# Patient Record
Sex: FEMALE | Race: WHITE | Employment: OTHER | URBAN - METROPOLITAN AREA
[De-identification: names, ages, dates, MRNs, and addresses within clinical notes are randomized per-mention and may not be internally consistent; named-entity substitution may affect disease eponyms.]

---

## 2020-07-13 ENCOUNTER — HOSPITAL ENCOUNTER (EMERGENCY)
Facility: CLINIC | Age: 65
Discharge: HOME | End: 2020-07-13
Attending: EMERGENCY MEDICINE | Admitting: EMERGENCY MEDICINE
Payer: COMMERCIAL

## 2020-07-13 ENCOUNTER — APPOINTMENT (OUTPATIENT)
Dept: CT IMAGING | Facility: CLINIC | Age: 65
End: 2020-07-13
Attending: EMERGENCY MEDICINE
Payer: COMMERCIAL

## 2020-07-13 VITALS
SYSTOLIC BLOOD PRESSURE: 150 MMHG | BODY MASS INDEX: 21.34 KG/M2 | TEMPERATURE: 98.6 F | WEIGHT: 125 LBS | OXYGEN SATURATION: 97 % | HEART RATE: 71 BPM | DIASTOLIC BLOOD PRESSURE: 85 MMHG | HEIGHT: 64 IN | RESPIRATION RATE: 18 BRPM

## 2020-07-13 DIAGNOSIS — B02.9 HERPES ZOSTER WITHOUT COMPLICATION: ICD-10-CM

## 2020-07-13 DIAGNOSIS — R51.9 SCALP PAIN: ICD-10-CM

## 2020-07-13 PROCEDURE — 99284 EMERGENCY DEPT VISIT MOD MDM: CPT | Mod: 25

## 2020-07-13 PROCEDURE — 70450 CT HEAD/BRAIN W/O DYE: CPT

## 2020-07-13 RX ORDER — SIMVASTATIN 10 MG
10 TABLET ORAL AT BEDTIME
COMMUNITY
Start: 2020-03-24

## 2020-07-13 RX ORDER — GABAPENTIN 300 MG/1
300 CAPSULE ORAL 3 TIMES DAILY PRN
COMMUNITY
Start: 2020-04-21

## 2020-07-13 RX ORDER — VALACYCLOVIR HYDROCHLORIDE 1 G/1
1000 TABLET, FILM COATED ORAL 3 TIMES DAILY
Qty: 21 TABLET | Refills: 0 | Status: SHIPPED | OUTPATIENT
Start: 2020-07-13 | End: 2020-08-11

## 2020-07-13 RX ORDER — LISINOPRIL 20 MG/1
20 TABLET ORAL DAILY
COMMUNITY
Start: 2020-03-24

## 2020-07-13 RX ORDER — LEVOTHYROXINE SODIUM 100 UG/1
100 TABLET ORAL DAILY
COMMUNITY
Start: 2020-04-27

## 2020-07-13 RX ORDER — TRAMADOL HYDROCHLORIDE 50 MG/1
50 TABLET ORAL EVERY 6 HOURS PRN
COMMUNITY
Start: 2020-01-20

## 2020-07-13 ASSESSMENT — ENCOUNTER SYMPTOMS
COUGH: 0
SHORTNESS OF BREATH: 0
DIARRHEA: 0
HEADACHES: 0
SORE THROAT: 0
VOMITING: 0
NECK STIFFNESS: 1
CHILLS: 0
NAUSEA: 0
FEVER: 0

## 2020-07-13 ASSESSMENT — MIFFLIN-ST. JEOR: SCORE: 1092

## 2020-07-13 NOTE — ED PROVIDER NOTES
"  History     Chief Complaint:  Scalp sensitivity    The history is provided by the patient.      Debbie Minaya is a 65 year old anticoagulated female who presents for an evaluation of pain on the back center of her scalp that radiates laterally which began 6 days ago when she noticed discomfort. She describes her pain as itchy, as if \"someone is pulling my hair\", and very sensitive.     Here, she notes that she has been taking ibuprofen to relieve her pain, but does not help. She also notes that when the pain returns it is in the same spot and also notes neck stiffness. Patient denies any fever, coughing, or COVID symptoms.     Allergies:  No Known Drug Allergies     Medications:    Valtrex  Augmentin  Zanaflex  Neurontin  Synthroid  Zocor  Zestril  Ultram  Aspirin 81 mg    Past Medical History:    Tobacco abuse  Hypertension  Hyperlipidemia  Hypothyroidism  Diffuse cystic mastopathy  Hand arthritis  Allergic rhinitis  Osteoporosis  Osteoarthritis; right knee  Degenerative joint disease    Past Surgical History:    Tubal ligation  Carpal tunnel release     Family History:    Mother - lung cancer, cataract, osteoarthritis  Father - heart disease  Son - amblyopia     Social History:  The patient was unaccompanied to the ED.  Smoking Status: Yes - every day smoker - electric cigarette (0.5 packs per day)  Smokeless Tobacco: Never used  Alcohol Use: Yes - 1-2 drinks monthly  Marital Status:   [2]     Review of Systems   Constitutional: Negative for chills and fever.   HENT: Negative for sore throat.    Respiratory: Negative for cough and shortness of breath.    Cardiovascular: Negative for chest pain.   Gastrointestinal: Negative for diarrhea, nausea and vomiting.   Musculoskeletal: Positive for neck stiffness.   Skin:        Scalp sensitivity   Neurological: Negative for headaches.   All other systems reviewed and are negative.    Physical Exam     Patient Vitals for the past 24 hrs:   BP Temp Pulse Heart " "Rate Resp SpO2 Height Weight   07/13/20 1630 (!) 150/85 -- 71 -- -- 97 % -- --   07/13/20 1615 -- -- -- -- -- 96 % -- --   07/13/20 1600 -- -- -- -- -- 97 % -- --   07/13/20 1545 -- -- -- -- -- 98 % -- --   07/13/20 1530 (!) 152/92 -- -- 78 -- 98 % -- --   07/13/20 1409 (!) 153/78 98.6  F (37  C) -- 98 18 98 % 1.626 m (5' 4\") 56.7 kg (125 lb)     Physical Exam  Nursing note and vitals reviewed.  Constitutional:  Appears well-developed and well-nourished.   HENT:    TM clear  Head:    Errythematist slightly raised focal rash to the right upper occipital region with localized tenderness. No drainage.  Eyes:    Pupils are equal, round, and reactive to light.   Neck:    Normal range of motion. Neck supple.      No tracheal deviation present. No thyromegaly present. No meningeal sign.  Cardiovascular:  Normal rate, regular rhythm, no murmur   Pulmonary/Chest: Breath sounds are clear and equal without wheezes or crackles.  Abdominal:   Soft. Bowel sounds are normal. Exhibits no distension and      no mass. There is no tenderness.      There is no rebound and no guarding.   Musculoskeletal:  Exhibits no edema.   Lymphadenopathy:  No cervical adenopathy.   Neurological:   Alert and oriented to person, place, and time. GCS 15.  CN 2-12 intact.  and proximal upper extremity strength strong and equal.  Bilateral lower extremity strength strong and equal, including strong dorsiflexion and plantarflexion strength.  Sensation intact and equal to the face, arms and legs.  No facial droop or weakness. Normal speech.  Follows commands and answers questions normally.    Skin:    Skin is warm and dry. No pallor. Errythematist slightly raised focal rash to the right upper occipital region with localized tenderness. No drainage.  Emergency Department Course   Imaging:  Radiographic findings were communicated with the patient who voiced understanding of the findings.    CT Head w/o Contrast  No evidence of acute intracranial " hemorrhage, mass, or   herniation.   As read by Radiology.    Emergency Department Course:  Past medical records, nursing notes, and vitals reviewed.  1530: I performed an exam of the patient and obtained history, as documented above.  The patient was sent for a head CT while in the emergency department, findings above.    1810: I rechecked the patient.  Findings and plan explained to the Patient. Patient discharged home with instructions regarding supportive care, medications, and reasons to return. The importance of close follow-up was reviewed.     Impression & Plan    Medical Decision Making:  Debbie Minaya is a 65 year old female who presents to the emergency department today for evaluation of scalp sensitivity. The patient has scalp pain and a rash, which is consistent with shingles. There was no sign of meningitis or encephalitis clinically, and no sign of intracranial bleeding or sinusitis on her CT. I discussed with her she could stop her antibiotics that was prescribed. She was prescribed Valtrex and intructed on signs and symptoms to return for including fever, global headache, neck pain, neck stiffness, or other concerning symptoms. Follow up with her doctor for recheck.     Diagnosis:    ICD-10-CM    1. Scalp pain  R51    2. Herpes zoster without complication  B02.9        Disposition:  discharged to home    Discharge Medications:   Details   valACYclovir (VALTREX) 1000 mg tablet Take 1 tablet (1,000 mg) by mouth 3 times daily for 7 days, Disp-21 tablet,R-0, Local Print     Scribe Disclosure:  IAnastasia, am serving as a scribe at 9:16 PM on 7/13/2020 to document services personally performed by Chel Alfredo MD based on my observations and the provider's statements to me.    Mario TELLO, am serving as a scribe at 7:20 PM on 7/13/2020 to document services personally performed by Chel Alfredo MD based on my observations and the provider's statements to me.       7/13/2020    EMERGENCY  DEPARTMENT         Chel Alfredo MD  07/13/20 2224       Chel Alfredo MD  07/13/20 2224       Chel Alfredo MD  07/13/20 2220

## 2020-07-13 NOTE — ED AVS SNAPSHOT
Emergency Department  [address] [place] [address]  Phone:  [phone]  Fax:  [phone]                                    Debbie Minaya   MRN: [id number]    Department:   Emergency Department   Date of Visit:  7/13/2020           After Visit Summary Signature Page    I have received my discharge instructions, and my questions have been answered. I have discussed any challenges I see with this plan with the nurse or doctor.    ..........................................................................................................................................  Patient/Patient Representative Signature      ..........................................................................................................................................  Patient Representative Print Name and Relationship to Patient    ..................................................               ................................................  Date                                   Time    ..........................................................................................................................................  Reviewed by Signature/Title    ...................................................              ..............................................  Date                                               Time          22EPIC Rev 08/18

## 2020-07-13 NOTE — ED TRIAGE NOTES
"Headache for one week, started in the back of head with palpation and now \"feels like someone is pulling her hair.\" Took 2 tylenol at 1300.   "

## 2020-08-11 ENCOUNTER — HOSPITAL ENCOUNTER (OUTPATIENT)
Facility: CLINIC | Age: 65
Setting detail: OBSERVATION
Discharge: HOME | End: 2020-08-13
Attending: EMERGENCY MEDICINE | Admitting: INTERNAL MEDICINE
Payer: COMMERCIAL

## 2020-08-11 DIAGNOSIS — E87.1 HYPONATREMIA: ICD-10-CM

## 2020-08-11 PROBLEM — B37.0 THRUSH: Status: ACTIVE | Noted: 2020-08-11

## 2020-08-11 PROBLEM — M54.9 BACK PAIN: Status: ACTIVE | Noted: 2020-08-11

## 2020-08-11 PROBLEM — D61.818 PANCYTOPENIA (H): Status: RESOLVED | Noted: 2020-08-11 | Resolved: 2020-08-11

## 2020-08-11 PROBLEM — B37.0 THRUSH: Status: RESOLVED | Noted: 2020-08-11 | Resolved: 2020-08-11

## 2020-08-11 PROBLEM — R42 DIZZINESS: Status: ACTIVE | Noted: 2020-08-11

## 2020-08-11 PROBLEM — I10 BENIGN ESSENTIAL HYPERTENSION: Status: ACTIVE | Noted: 2020-08-11

## 2020-08-11 PROBLEM — F17.200 SMOKER: Status: ACTIVE | Noted: 2020-08-11

## 2020-08-11 PROBLEM — R11.0 NAUSEA: Status: ACTIVE | Noted: 2020-08-11

## 2020-08-11 PROBLEM — E87.6 HYPOKALEMIA: Status: ACTIVE | Noted: 2020-08-11

## 2020-08-11 PROBLEM — D61.818 PANCYTOPENIA (H): Status: ACTIVE | Noted: 2020-08-11

## 2020-08-11 LAB
ALBUMIN SERPL-MCNC: 4.2 G/DL (ref 3.4–5)
ALBUMIN UR-MCNC: NEGATIVE MG/DL
ALP SERPL-CCNC: 77 U/L (ref 40–150)
ALT SERPL W P-5'-P-CCNC: 29 U/L (ref 0–50)
ANION GAP SERPL CALCULATED.3IONS-SCNC: 6 MMOL/L (ref 3–14)
APPEARANCE UR: CLEAR
AST SERPL W P-5'-P-CCNC: 17 U/L (ref 0–45)
BASOPHILS # BLD AUTO: 0.1 10E9/L (ref 0–0.2)
BASOPHILS NFR BLD AUTO: 0.6 %
BILIRUB SERPL-MCNC: 0.4 MG/DL (ref 0.2–1.3)
BILIRUB UR QL STRIP: NEGATIVE
BUN SERPL-MCNC: 7 MG/DL (ref 7–30)
CALCIUM SERPL-MCNC: 9.3 MG/DL (ref 8.5–10.1)
CHLORIDE SERPL-SCNC: 89 MMOL/L (ref 94–109)
CO2 SERPL-SCNC: 27 MMOL/L (ref 20–32)
COLOR UR AUTO: YELLOW
CREAT SERPL-MCNC: 0.46 MG/DL (ref 0.52–1.04)
DIFFERENTIAL METHOD BLD: ABNORMAL
EOSINOPHIL # BLD AUTO: 0.1 10E9/L (ref 0–0.7)
EOSINOPHIL NFR BLD AUTO: 0.8 %
ERYTHROCYTE [DISTWIDTH] IN BLOOD BY AUTOMATED COUNT: 12.5 % (ref 10–15)
GFR SERPL CREATININE-BSD FRML MDRD: >90 ML/MIN/{1.73_M2}
GLUCOSE SERPL-MCNC: 120 MG/DL (ref 70–99)
GLUCOSE UR STRIP-MCNC: NEGATIVE MG/DL
HCT VFR BLD AUTO: 37.2 % (ref 35–47)
HGB BLD-MCNC: 13.4 G/DL (ref 11.7–15.7)
HGB UR QL STRIP: ABNORMAL
IMM GRANULOCYTES # BLD: 0 10E9/L (ref 0–0.4)
IMM GRANULOCYTES NFR BLD: 0.3 %
INTERPRETATION ECG - MUSE: NORMAL
KETONES UR STRIP-MCNC: NEGATIVE MG/DL
LEUKOCYTE ESTERASE UR QL STRIP: NEGATIVE
LYMPHOCYTES # BLD AUTO: 2.3 10E9/L (ref 0.8–5.3)
LYMPHOCYTES NFR BLD AUTO: 21.8 %
MCH RBC QN AUTO: 33.3 PG (ref 26.5–33)
MCHC RBC AUTO-ENTMCNC: 36 G/DL (ref 31.5–36.5)
MCV RBC AUTO: 93 FL (ref 78–100)
MONOCYTES # BLD AUTO: 0.5 10E9/L (ref 0–1.3)
MONOCYTES NFR BLD AUTO: 4.8 %
NEUTROPHILS # BLD AUTO: 7.6 10E9/L (ref 1.6–8.3)
NEUTROPHILS NFR BLD AUTO: 71.7 %
NITRATE UR QL: NEGATIVE
NRBC # BLD AUTO: 0 10*3/UL
NRBC BLD AUTO-RTO: 0 /100
PH UR STRIP: 6 PH (ref 5–7)
PLATELET # BLD AUTO: 476 10E9/L (ref 150–450)
POTASSIUM SERPL-SCNC: 3.2 MMOL/L (ref 3.4–5.3)
PROT SERPL-MCNC: 7.3 G/DL (ref 6.8–8.8)
RBC # BLD AUTO: 4.02 10E12/L (ref 3.8–5.2)
RBC #/AREA URNS AUTO: 6 /HPF (ref 0–2)
SODIUM SERPL-SCNC: 122 MMOL/L (ref 133–144)
SODIUM SERPL-SCNC: 134 MMOL/L (ref 133–144)
SOURCE: ABNORMAL
SP GR UR STRIP: 1.01 (ref 1–1.03)
TROPONIN I SERPL-MCNC: <0.015 UG/L (ref 0–0.04)
TSH SERPL DL<=0.005 MIU/L-ACNC: 2.28 MU/L (ref 0.4–4)
UROBILINOGEN UR STRIP-MCNC: NORMAL MG/DL (ref 0–2)
WBC # BLD AUTO: 10.5 10E9/L (ref 4–11)
WBC #/AREA URNS AUTO: 0 /HPF (ref 0–5)

## 2020-08-11 PROCEDURE — 25000132 ZZH RX MED GY IP 250 OP 250 PS 637: Performed by: INTERNAL MEDICINE

## 2020-08-11 PROCEDURE — 84443 ASSAY THYROID STIM HORMONE: CPT | Performed by: EMERGENCY MEDICINE

## 2020-08-11 PROCEDURE — 25800030 ZZH RX IP 258 OP 636: Performed by: INTERNAL MEDICINE

## 2020-08-11 PROCEDURE — G0378 HOSPITAL OBSERVATION PER HR: HCPCS

## 2020-08-11 PROCEDURE — 84295 ASSAY OF SERUM SODIUM: CPT | Performed by: INTERNAL MEDICINE

## 2020-08-11 PROCEDURE — 36415 COLL VENOUS BLD VENIPUNCTURE: CPT | Performed by: INTERNAL MEDICINE

## 2020-08-11 PROCEDURE — 80053 COMPREHEN METABOLIC PANEL: CPT | Performed by: EMERGENCY MEDICINE

## 2020-08-11 PROCEDURE — 99285 EMERGENCY DEPT VISIT HI MDM: CPT | Mod: 25

## 2020-08-11 PROCEDURE — U0003 INFECTIOUS AGENT DETECTION BY NUCLEIC ACID (DNA OR RNA); SEVERE ACUTE RESPIRATORY SYNDROME CORONAVIRUS 2 (SARS-COV-2) (CORONAVIRUS DISEASE [COVID-19]), AMPLIFIED PROBE TECHNIQUE, MAKING USE OF HIGH THROUGHPUT TECHNOLOGIES AS DESCRIBED BY CMS-2020-01-R: HCPCS | Performed by: INTERNAL MEDICINE

## 2020-08-11 PROCEDURE — 96365 THER/PROPH/DIAG IV INF INIT: CPT

## 2020-08-11 PROCEDURE — 84484 ASSAY OF TROPONIN QUANT: CPT | Performed by: EMERGENCY MEDICINE

## 2020-08-11 PROCEDURE — 85025 COMPLETE CBC W/AUTO DIFF WBC: CPT | Performed by: EMERGENCY MEDICINE

## 2020-08-11 PROCEDURE — 99220 ZZC INITIAL OBSERVATION CARE,LEVL III: CPT | Performed by: INTERNAL MEDICINE

## 2020-08-11 PROCEDURE — 93005 ELECTROCARDIOGRAM TRACING: CPT

## 2020-08-11 PROCEDURE — 81001 URINALYSIS AUTO W/SCOPE: CPT | Performed by: EMERGENCY MEDICINE

## 2020-08-11 PROCEDURE — 25000128 H RX IP 250 OP 636: Performed by: EMERGENCY MEDICINE

## 2020-08-11 RX ORDER — POTASSIUM CHLORIDE 1500 MG/1
20 TABLET, EXTENDED RELEASE ORAL ONCE
Status: COMPLETED | OUTPATIENT
Start: 2020-08-11 | End: 2020-08-11

## 2020-08-11 RX ORDER — POTASSIUM CL/LIDO/0.9 % NACL 10MEQ/0.1L
10 INTRAVENOUS SOLUTION, PIGGYBACK (ML) INTRAVENOUS
Status: DISCONTINUED | OUTPATIENT
Start: 2020-08-11 | End: 2020-08-13 | Stop reason: HOSPADM

## 2020-08-11 RX ORDER — ACETAMINOPHEN 325 MG/1
325-650 TABLET ORAL EVERY 6 HOURS PRN
COMMUNITY

## 2020-08-11 RX ORDER — POTASSIUM CHLORIDE 1500 MG/1
40 TABLET, EXTENDED RELEASE ORAL ONCE
Status: COMPLETED | OUTPATIENT
Start: 2020-08-11 | End: 2020-08-11

## 2020-08-11 RX ORDER — LISINOPRIL 20 MG/1
20 TABLET ORAL DAILY
Status: DISCONTINUED | OUTPATIENT
Start: 2020-08-12 | End: 2020-08-13 | Stop reason: HOSPADM

## 2020-08-11 RX ORDER — NALOXONE HYDROCHLORIDE 0.4 MG/ML
.1-.4 INJECTION, SOLUTION INTRAMUSCULAR; INTRAVENOUS; SUBCUTANEOUS
Status: DISCONTINUED | OUTPATIENT
Start: 2020-08-11 | End: 2020-08-13 | Stop reason: HOSPADM

## 2020-08-11 RX ORDER — AMLODIPINE BESYLATE 5 MG/1
5 TABLET ORAL 2 TIMES DAILY PRN
Status: DISCONTINUED | OUTPATIENT
Start: 2020-08-11 | End: 2020-08-13 | Stop reason: HOSPADM

## 2020-08-11 RX ORDER — ASPIRIN 81 MG/1
81 TABLET, CHEWABLE ORAL DAILY
Status: DISCONTINUED | OUTPATIENT
Start: 2020-08-11 | End: 2020-08-13 | Stop reason: HOSPADM

## 2020-08-11 RX ORDER — ONDANSETRON 2 MG/ML
4 INJECTION INTRAMUSCULAR; INTRAVENOUS EVERY 6 HOURS PRN
Status: DISCONTINUED | OUTPATIENT
Start: 2020-08-11 | End: 2020-08-13 | Stop reason: HOSPADM

## 2020-08-11 RX ORDER — SIMVASTATIN 10 MG
10 TABLET ORAL AT BEDTIME
Status: DISCONTINUED | OUTPATIENT
Start: 2020-08-11 | End: 2020-08-13 | Stop reason: HOSPADM

## 2020-08-11 RX ORDER — ONDANSETRON 4 MG/1
4 TABLET, ORALLY DISINTEGRATING ORAL EVERY 6 HOURS PRN
Status: DISCONTINUED | OUTPATIENT
Start: 2020-08-11 | End: 2020-08-13 | Stop reason: HOSPADM

## 2020-08-11 RX ORDER — ACETAMINOPHEN 650 MG/1
650 SUPPOSITORY RECTAL EVERY 4 HOURS PRN
Status: DISCONTINUED | OUTPATIENT
Start: 2020-08-11 | End: 2020-08-13 | Stop reason: HOSPADM

## 2020-08-11 RX ORDER — NYSTATIN 100000/ML
500000 SUSPENSION, ORAL (FINAL DOSE FORM) ORAL 4 TIMES DAILY
Status: DISCONTINUED | OUTPATIENT
Start: 2020-08-11 | End: 2020-08-11

## 2020-08-11 RX ORDER — SODIUM CHLORIDE 9 MG/ML
INJECTION, SOLUTION INTRAVENOUS CONTINUOUS
Status: DISCONTINUED | OUTPATIENT
Start: 2020-08-11 | End: 2020-08-12

## 2020-08-11 RX ORDER — LEVOTHYROXINE SODIUM 100 UG/1
100 TABLET ORAL
Status: DISCONTINUED | OUTPATIENT
Start: 2020-08-12 | End: 2020-08-13 | Stop reason: HOSPADM

## 2020-08-11 RX ORDER — ACETAMINOPHEN 325 MG/1
650 TABLET ORAL EVERY 4 HOURS PRN
Status: DISCONTINUED | OUTPATIENT
Start: 2020-08-11 | End: 2020-08-13 | Stop reason: HOSPADM

## 2020-08-11 RX ORDER — ASPIRIN 81 MG/1
81 TABLET, CHEWABLE ORAL DAILY
COMMUNITY

## 2020-08-11 RX ORDER — HYDROCHLOROTHIAZIDE 12.5 MG/1
12.5 CAPSULE ORAL DAILY
Status: ON HOLD | COMMUNITY
End: 2020-08-13

## 2020-08-11 RX ADMIN — ACETAMINOPHEN 650 MG: 325 TABLET, FILM COATED ORAL at 23:35

## 2020-08-11 RX ADMIN — ASPIRIN 81 MG 81 MG: 81 TABLET ORAL at 20:57

## 2020-08-11 RX ADMIN — POTASSIUM CHLORIDE 20 MEQ: 1500 TABLET, EXTENDED RELEASE ORAL at 23:33

## 2020-08-11 RX ADMIN — SIMVASTATIN 10 MG: 10 TABLET, FILM COATED ORAL at 20:57

## 2020-08-11 RX ADMIN — SODIUM CHLORIDE: 9 INJECTION, SOLUTION INTRAVENOUS at 20:45

## 2020-08-11 RX ADMIN — POTASSIUM CHLORIDE 40 MEQ: 1500 TABLET, EXTENDED RELEASE ORAL at 20:57

## 2020-08-11 RX ADMIN — Medication 10 MEQ: at 18:01

## 2020-08-11 ASSESSMENT — MIFFLIN-ST. JEOR: SCORE: 1104.7

## 2020-08-11 NOTE — H&P
[facility]    History and Physical  Hospitalist       Date of Admission:  8/11/2020    Assessment & Plan   65 year old female with past medical hx of HTN, who presents with dizziness and weakness and found to have low Sodium and potassium:     Summary:    Principal Problem:    Hyponatremia -- Possibly 2nd to hydrochlorothiazide   -- this might be causing her dysequilibrium and weakness   -- IV normal saline at 100 ml/hr, and stop hydrochlorothiazide      Hypokalemia -- related to the hydrochlorothiazide   -- replace    Active Problems:    Benign essential hypertension   -- continue Lisinopril 20 mg daily, will add Norvasc 5 mg bid PRN (and if needed continue 5 to 10 mg to treat hypertension)      Dizziness -- ?related to low sodium   -- further workup if persistent symptoms.       Nausea      Back pain -- suspect musculoskeletal, now appears resolved.  Can not exclude that she passed a kidney stone, but pain was mild, and bilateral      Smoker -- discussed smoking cessation       Plan: IV normal saline, repeat BMP in AM, replace potassium.      DVT Prophylaxis: Pneumatic Compression Devices  Code Status: Full Code    Disposition: Expected discharge in 1-2 days once sodium improved    Lokesh Pang MD  Pager: [phone]  Cell Phone:  [phone]     Primary Care Physician   [facility]    Chief Complaint   Dizziness, weakness    History is obtained from Patient    History of Present Illness   65 year old female with past medical hx of HTN, who presents with dizziness and weakness and nausea. She reports that she recently had shingles across the top of her head (seen in ER -- tender spot over occiput, thought it could be shingles involving midline area of C2 dermatome -- but she has also had Herpes Zoster vaccine x 2, so not definite Herpes Zoster infection). When she gets a sinus headache she pain over the Occiput -- which she relates to flair of the shingles pain. Today,  she was supposed to get blood drawn for an electrolyte check but discovered she had a high blood pressure.  Reports that she was put on 2 doses of Diflucan for a possible yeast infection after she got treated with Amoxicillin last week for possible sinus infection -- but it didn't help her symptoms. She was put on hydrochlorothiazide about 5 years ago but was discontinued for low sodium levels. Also, states she was having bilateral flank pain and back pain, and subsequent dysuria -- thought she might have had a kidney stone (she never had one, but her  has). She went to her PCP about a month ago and had blood in her urine so she requested a recheck. Patient also reports that she has been noticing a really bad smell. She was using saline rinses to help her symptoms but her PCP told her to stop.     In ER, initial /69, afebrile, Sodium 122 with potassium 3.2, and creatinine 0.46, Trop < 0.015, and platelets mildly elevated at 476.  UA here with 6 RBC's present, and no WBC's.  EKG with NSR with rate of 68.      PAST MEDICAL HISTORY    Past Medical History:   Diagnosis Date     Hypertension      Microscopic hematuria      Patella fracture, Right 2020    treated with knee immobilizer        PAST SURGICAL HISTORY    Past Surgical History:   Procedure Laterality Date     CARPAL TUNNEL RELEASE RT/LT Bilateral      RELEASE TRIGGER FINGER Bilateral         Prior to Admission Medications   Prior to Admission Medications   Prescriptions Last Dose Informant Patient Reported? Taking?   acetaminophen (TYLENOL) 325 MG tablet Past Week at Unknown time  Yes Yes   Sig: Take 325-650 mg by mouth every 6 hours as needed for mild pain   aspirin (ASA) 81 MG chewable tablet 8/10/2020 at Unknown time Self Yes Yes   Sig: Take 81 mg by mouth daily   gabapentin (NEURONTIN) 300 MG capsule Past Month at Unknown time Self Yes Yes   Sig: Take 300 mg by mouth 3 times daily as needed    hydrochlorothiazide (MICROZIDE) 12.5 MG capsule  8/11/2020 at Unknown time Self Yes Yes   Sig: Take 12.5 mg by mouth daily   levothyroxine (SYNTHROID) 100 MCG tablet 8/11/2020 at Unknown time Self Yes Yes   Sig: Take 100 mcg by mouth daily    lisinopril (ZESTRIL) 20 MG tablet 8/11/2020 at Unknown time Self Yes Yes   Sig: Take 20 mg by mouth daily    simvastatin (ZOCOR) 10 MG tablet 8/10/2020 at Unknown time Self Yes Yes   Sig: Take 10 mg by mouth At Bedtime    tiZANidine (ZANAFLEX) 4 MG tablet  Self Yes No   Sig: TAKE 1 TABLET BY MOUTH TWICE DAILY AS NEEDED   traMADol (ULTRAM) 50 MG tablet  Self Yes No   Sig: Take 50 mg by mouth every 6 hours as needed       Facility-Administered Medications: None     Allergies   No Known Allergies    SOCIAL HISTORY    Social History     Social History Narrative    , one son, she is a retired . (last updated 8/11/2020)       Social History     Tobacco Use     Smoking status: Current Every Day Smoker     Packs/day: 1.00     Years: 48.00     Pack years: 48.00   Substance Use Topics     Alcohol use: Yes     Comment: 1 beer a week     Drug use: Not on file        FAMILY HISTORY    Family History   Problem Relation Age of Onset     Lung Cancer Mother      Myocardial Infarction Father         Review of Systems   The 10 point Review of Systems is negative other than noted in the HPI or here.       PHYSICAL EXAM     Temp: 98.1  F (36.7  C) Temp src: Oral BP: 135/74 Pulse: 68   Resp: 16 SpO2: 96 % O2 Device: None (Room air)    Vital Signs with Ranges  Temp:  [98  F (36.7  C)-98.1  F (36.7  C)] 98.1  F (36.7  C)  Pulse:  [68-78] 68  Resp:  [16] 16  BP: (135-190)/(64-80) 135/74  SpO2:  [96 %-100 %] 96 %  125 lbs 0 oz    Constitutional: Awake, alert, cooperative, no apparent distress.  Eyes: Conjunctiva and pupils examined and normal.  HEENT: Moist mucous membranes, normal dentition.  Respiratory: Clear to auscultation bilaterally, no crackles or wheezing.  Cardiovascular: Regular rate and rhythm, normal S1 and S2, and no  murmur noted, no carotid bruits.  No ankle edema.   GI: Soft, non-distended, non-tender, normal bowel sounds.  Lymph/Hematologic: No anterior cervical, supraclavicular or axillary adenopathy.  Skin: No rashes, no cyanosis.   Musculoskeletal: No joint swelling, erythema or tenderness.  Neurologic: Alert, Ox3, Cranial nerves 2-12 intact, no focal weakness or numbness  Psychiatric:  No obvious anxiety or depression.    Data   Data reviewed today:  I personally reviewed the EKG tracing showing as above (NSR).  Recent Labs   Lab 08/11/20  1625   WBC 10.5   HGB 13.4   MCV 93   *   *   POTASSIUM 3.2*   CHLORIDE 89*   CO2 27   BUN 7   CR 0.46*   ANIONGAP 6   CHI 9.3   *   ALBUMIN 4.2   PROTTOTAL 7.3   BILITOTAL 0.4   ALKPHOS 77   ALT 29   AST 17   TROPI <0.015       Imaging:  No results found for this or any previous visit (from the past 24 hour(s)).

## 2020-08-11 NOTE — ED PROVIDER NOTES
"  History     Chief Complaint:    Dizziness and Flank Pain      The history is provided by the patient.      Debbie Minaya is a 65 year old female with a history of hypertension, hyperlipidemia, and hypothyroidism who presents with dizziness, weakness, and nausea. She reports that she recently had shingles across the top of her head. When she gets a sinus headache her shingles symptoms trigger. Today, she was supposed to get blood drawn for an electrolyte check but discovered she had a high blood pressure. About a week ago she was put on amoxicillin but stopped today due to \"multiple problems\". Reports that she was put on 2 rounds of Diflucan for a yeast infection.  Also, states she was having flank and back pain and thought she had a kidney stone and reports she went to her PCP about a month ago and had blood in her urine so she requested a recheck. Patient also reports that she has been noticing a really bad smell inside her nose which she thinks is a sinus infection. She was using saline rinses to help her symptoms but her PCP told her to stop. She denies any neck pain. She is a poor historian and does not provide a coherant history.  When told her sodium was low she does recall that she was put on hydrochlorothiazide about 4 years ago but was discontinued for low sodium levels.  No fevers or cough.  No CP.    Allergies:  No Known Drug Allergies    Medications:    Diflucan  Oretic  Neurontin  Zocor  Synthroid  Zestril  Zanaflex  Ultram  Amoxicillin    Past Medical History:    RLS  Fracture of patella  Arthritis  Diffuse cystic mastopathy  Tobacco use disorder  Hypertension  Hyperlipidemia  Hypothyroidism  Derangement of meniscus    Past Surgical History:      Tubal ligation  Carpal tunnel release    Family History:    Father: Heart disease, MI  Mother: Lung cancer, cataract, osteoarthritis    Social History:  Smoking Status: Current Smoker  Smokeless Tobacco: Never Used  Alcohol Use: Positive  Drug " "Use: Negative  PCP: [facility], [place]    Marital Status:         Review of Systems     10 systems reviewed and negative except as above and in HPI.    Physical Exam     Patient Vitals for the past 24 hrs:   BP Temp Temp src Pulse Resp SpO2 Height Weight   08/11/20 1700 138/64 -- -- 68 -- -- -- --   08/11/20 1630 (!) 171/76 -- -- 78 -- -- -- --   08/11/20 1625 (!) 145/80 -- -- 77 -- -- -- --   08/11/20 1238 (!) 190/69 98  F (36.7  C) Oral 77 16 100 % 1.646 m (5' 4.8\") 56.7 kg (125 lb)       Physical Exam  General: Resting on the gurney, speech is somewhat slow. Poor insight. Somewhat confused. appears uncomfortable  Head:  The scalp, face, and head appear normal  Mouth/Throat: Mucus membranes are moist. Somewhat boggy mucus membranes consistent with recent nasal congestion.   CV:  Regular rate    Normal S1 and S2  No pathological murmur   Resp:  Breath sounds clear and equal bilaterally    Non-labored, no retractions or accessory muscle use    No coarseness    No wheezing   GI:  Abdomen is soft, no rigidity    No tenderness to palpation  MS:  Normal motor assessment of all extremities.    Good capillary refill noted.      Skin:   No rash or lesions noted.  Neuro:  Speech is normal and fluent. No focal neurological deficit.  Psych: Awake. Alert.  Normal affect.      Appropriate interactions.      Emergency Department Course     ECG:  Indication: Dizziness  Time: 1646  Vent. Rate 68 bpm. AZ interval 146. QRS duration 88. QT/QTc 400/425. P-R-T axis 75 70 61. Normal sinus rhythm. Possible left atrial enlargement. Borderline ECG. Read time: 1656     Laboratory:  Laboratory findings were communicated with the patient and Admitting MD who voiced understanding of the findings.    CMP: Glucose 120 (H), Sodium: 122 (H), Potassium: 3.2 (L), Chloride: 89 (L), Creatinine: 0.46 (L) , o/w WNL     CBC: WBC: 10.5, HGB: 13.4, PLT: 476 (H)    1625 Troponin: <0.015    TSH: 2.28    UA with Microscopic: Blood: Small (A), " RBC: 6 (H) o/w Negative    Interventions:  1801 Potassium Chloride in 10 mg lidocaine infusion 10 mEq IV    Emergency Department Course:  Past medical records, nursing notes, and vitals reviewed.    1625 I performed an exam of the patient as documented above.     EKG obtained in the ED, see results above.     IV was inserted and blood was drawn for laboratory testing, results above.    The patient provided a urine sample here in the emergency department. This was sent for laboratory testing, findings above.    1726 I consulted with Dr. Marie, Hospitalist, regarding the patient's history and presentation here in the emergency department.    1755 I rechecked the patient and discussed the results of her workup thus far.     Findings and plan explained to the Patient who consents to admission. Discussed the patient with Dr. Marie, who will admit the patient to an Adult Med/Surg bed for further monitoring, evaluation, and treatment.    I personally reviewed the laboratory results with the Patient and answered all related questions prior to admission.     Impression & Plan     Medical Decision Making:  Debbie Minaya is a 65 year old female presents to the emergency department with multiple complaints.  Her biggest complaint is feeling dizzy and as though she has a sinus infection.  She is a very poor historian and seems easily confused.  Laboratory evaluation was undertaken and she was noted to be profoundly hyponatremic.  This is most likely the cause of her symptoms.  She is given IV fluids and potassium replacement.  Hyponatremia is most likely secondary to her hydrochlorothiazide use and after informed her of her diagnosis she reports having a problem with low sodium the last time she was on hydrochlorothiazide several years ago as well.  Currently there is no indication for hypertonic saline.  She was discussed with the hospitalist and will be admitted for careful correction of her  hyponatremia.      Diagnosis:    ICD-10-CM    1. Hyponatremia  E87.1 TSH       Disposition:  Admitted to .    Scribe Disclosure:  I, Joshua Yates, am serving as a scribe at 4:30 PM on 8/11/2020 to document services personally performed by Pam Mendez MD based on my observations and the provider's statements to me.        Pam Mendez MD  08/11/20 5221

## 2020-08-11 NOTE — ED NOTES
[facility]  ED Nurse Handoff Report    ED Chief complaint: Dizziness and Flank Pain      ED Diagnosis:   Final diagnoses:   Hyponatremia       Code Status: Full Code    Allergies: No Known Allergies    Patient Story: Patient comes in with dizziness today after being started recently on Hydrochlorothiazide.  She was supposed to have lab work today for follow up because of the new HTN med but stated she came to the ED to be evaluated for her dizziness.  Focused Assessment:  65 year old female who presents with dizziness, weakness, and nausea. She reports that she recently had shingles across the top of her head. When she gets a sinus headache her shingles symptoms trigger. Today she was supposed to get blood drawn for an electrolyte check but discovered she had a high blood pressure. About a week ago she was put on amoxicillin but stopped today due to multiple problems. Reports that she was put on 2 rounds of Diflucan for a yeast infection. Also, states she was having flank and back pain due to a kidney stone. She went to her PCP about a month ago and had blood in her urine so she requested a recheck. Patient also reports that she has been noticing a really bad smell.   She was using saline rinses to help her symptoms but her PCP told her to stop. She denies any neck pain.     Treatments and/or interventions provided: monitoring, EKg, labs, IV fluids  Patient's response to treatments and/or interventions: good    To be done/followed up on inpatient unit:  following of MD orders    Does this patient have any cognitive concerns?: forgetful (hyponatremic)    Activity level - Baseline/Home:  indepdent  Activity Level - Current:   Independent    Patient's Preferred language: English   Needed?: No    Isolation: None  Infection: Not Applicable  none  Bariatric?: No    Vital Signs:   Vitals:    08/11/20 1238 08/11/20 1625 08/11/20 1630 08/11/20 1700   BP: (!) 190/69 (!) 145/80 (!) 171/76 138/64  "  Pulse: 77 77 78 68   Resp: 16      Temp: 98  F (36.7  C)      TempSrc: Oral      SpO2: 100%      Weight: 56.7 kg (125 lb)      Height: 1.646 m (5' 4.8\")          Cardiac Rhythm:     Was the PSS-3 completed:   Yes  What interventions are required if any?               Family Comments:  at home  OBS brochure/video discussed/provided to patient/family: N/A              Name of person given brochure if not patient: n/a              Relationship to patient: n/a    For the majority of the shift this patient's behavior was Green.   Behavioral interventions performed were n/a.    ED NURSE PHONE NUMBER: *[phone]         "

## 2020-08-11 NOTE — ED TRIAGE NOTES
Dizziness started this morning, has had sinus problem the last week. Also, started hydrochlorothiazide recently and was supposed to get labs drawn today for electrolytes. No injury. Also, was having flank pain/back pain and was supposed to get repeat UA as well.

## 2020-08-11 NOTE — PHARMACY-ADMISSION MEDICATION HISTORY
Pharmacy Medication History  Admission medication history interview status for the 8/11/2020  admission is complete. See EPIC admission navigator for prior to admission medications     Medication history sources: Patient and sure scripts     Medication history source reliability: Good  Adherence assessment: Good    Significant changes made to the medication list:  Discontinued:  New: hctz  Changed:           Additional medication history information:       Medication reconciliation completed by provider prior to medication history? No    Time spent in this activity: 15min       Prior to Admission medications    Medication Sig Last Dose Taking? Auth Provider   acetaminophen (TYLENOL) 325 MG tablet Take 325-650 mg by mouth every 6 hours as needed for mild pain Past Week at Unknown time Yes Unknown, Entered By History   aspirin (ASA) 81 MG chewable tablet Take 81 mg by mouth daily 8/10/2020 at Unknown time Yes Unknown, Entered By History   gabapentin (NEURONTIN) 300 MG capsule Take 300 mg by mouth 3 times daily as needed  Past Month at Unknown time Yes Reported, Patient   hydrochlorothiazide (MICROZIDE) 12.5 MG capsule Take 12.5 mg by mouth daily 8/11/2020 at Unknown time Yes Unknown, Entered By History   levothyroxine (SYNTHROID) 100 MCG tablet Take 100 mcg by mouth daily  8/11/2020 at Unknown time Yes Reported, Patient   lisinopril (ZESTRIL) 20 MG tablet Take 20 mg by mouth daily  8/11/2020 at Unknown time Yes Reported, Patient   simvastatin (ZOCOR) 10 MG tablet Take 10 mg by mouth At Bedtime  8/10/2020 at Unknown time Yes Reported, Patient   tiZANidine (ZANAFLEX) 4 MG tablet TAKE 1 TABLET BY MOUTH TWICE DAILY AS NEEDED   Reported, Patient   traMADol (ULTRAM) 50 MG tablet Take 50 mg by mouth every 6 hours as needed    Reported, Patient     Margaret Brumfield PharmD

## 2020-08-12 LAB
ANION GAP SERPL CALCULATED.3IONS-SCNC: 2 MMOL/L (ref 3–14)
ANION GAP SERPL CALCULATED.3IONS-SCNC: 5 MMOL/L (ref 3–14)
BUN SERPL-MCNC: 15 MG/DL (ref 7–30)
BUN SERPL-MCNC: 6 MG/DL (ref 7–30)
CALCIUM SERPL-MCNC: 9 MG/DL (ref 8.5–10.1)
CALCIUM SERPL-MCNC: 9.2 MG/DL (ref 8.5–10.1)
CHLORIDE SERPL-SCNC: 103 MMOL/L (ref 94–109)
CHLORIDE SERPL-SCNC: 106 MMOL/L (ref 94–109)
CO2 SERPL-SCNC: 26 MMOL/L (ref 20–32)
CO2 SERPL-SCNC: 28 MMOL/L (ref 20–32)
CREAT SERPL-MCNC: 0.49 MG/DL (ref 0.52–1.04)
CREAT SERPL-MCNC: 0.59 MG/DL (ref 0.52–1.04)
GFR SERPL CREATININE-BSD FRML MDRD: >90 ML/MIN/{1.73_M2}
GFR SERPL CREATININE-BSD FRML MDRD: >90 ML/MIN/{1.73_M2}
GLUCOSE SERPL-MCNC: 101 MG/DL (ref 70–99)
GLUCOSE SERPL-MCNC: 97 MG/DL (ref 70–99)
POTASSIUM SERPL-SCNC: 4.1 MMOL/L (ref 3.4–5.3)
POTASSIUM SERPL-SCNC: 4.6 MMOL/L (ref 3.4–5.3)
SARS-COV-2 RNA SPEC QL NAA+PROBE: NOT DETECTED
SODIUM SERPL-SCNC: 133 MMOL/L (ref 133–144)
SODIUM SERPL-SCNC: 134 MMOL/L (ref 133–144)
SODIUM SERPL-SCNC: 136 MMOL/L (ref 133–144)
SPECIMEN SOURCE: NORMAL

## 2020-08-12 PROCEDURE — 80048 BASIC METABOLIC PNL TOTAL CA: CPT | Performed by: INTERNAL MEDICINE

## 2020-08-12 PROCEDURE — 25800030 ZZH RX IP 258 OP 636: Performed by: HOSPITALIST

## 2020-08-12 PROCEDURE — 25000132 ZZH RX MED GY IP 250 OP 250 PS 637: Performed by: INTERNAL MEDICINE

## 2020-08-12 PROCEDURE — G0378 HOSPITAL OBSERVATION PER HR: HCPCS

## 2020-08-12 PROCEDURE — 36415 COLL VENOUS BLD VENIPUNCTURE: CPT | Performed by: HOSPITALIST

## 2020-08-12 PROCEDURE — 36415 COLL VENOUS BLD VENIPUNCTURE: CPT | Performed by: INTERNAL MEDICINE

## 2020-08-12 PROCEDURE — 25000132 ZZH RX MED GY IP 250 OP 250 PS 637: Performed by: HOSPITALIST

## 2020-08-12 PROCEDURE — 99226 ZZC SUBSEQUENT OBSERVATION CARE,LEVEL III: CPT | Performed by: HOSPITALIST

## 2020-08-12 PROCEDURE — 84295 ASSAY OF SERUM SODIUM: CPT | Performed by: HOSPITALIST

## 2020-08-12 PROCEDURE — 80048 BASIC METABOLIC PNL TOTAL CA: CPT | Performed by: HOSPITALIST

## 2020-08-12 RX ORDER — DEXTROSE MONOHYDRATE 50 MG/ML
INJECTION, SOLUTION INTRAVENOUS CONTINUOUS
Status: DISCONTINUED | OUTPATIENT
Start: 2020-08-12 | End: 2020-08-12 | Stop reason: ALTCHOICE

## 2020-08-12 RX ORDER — GABAPENTIN 300 MG/1
300 CAPSULE ORAL 3 TIMES DAILY PRN
Status: DISCONTINUED | OUTPATIENT
Start: 2020-08-12 | End: 2020-08-13 | Stop reason: HOSPADM

## 2020-08-12 RX ADMIN — SIMVASTATIN 10 MG: 10 TABLET, FILM COATED ORAL at 21:13

## 2020-08-12 RX ADMIN — LEVOTHYROXINE SODIUM 100 MCG: 100 TABLET ORAL at 06:30

## 2020-08-12 RX ADMIN — ASPIRIN 81 MG 81 MG: 81 TABLET ORAL at 08:05

## 2020-08-12 RX ADMIN — DEXTROSE MONOHYDRATE: 50 INJECTION, SOLUTION INTRAVENOUS at 00:41

## 2020-08-12 RX ADMIN — LISINOPRIL 20 MG: 20 TABLET ORAL at 08:06

## 2020-08-12 RX ADMIN — GABAPENTIN 300 MG: 300 CAPSULE ORAL at 21:16

## 2020-08-12 NOTE — PROGRESS NOTES
"[facility]    Hospitalist Progress Note      Assessment & Plan   Debbie Minaya is a 65 year old female was admitted on 8/11/2020 with PMH hypertension presents with dizziness.      Hyponatremia --  2nd to hydrochlorothiazide    By patient's report this is second time she had symptomatic hyponatremia with use of HCTZ.  Patient had similar symptoms of dizziness, weakness and paresthesia.  With discontinuation of HCTZ and correction of sodium with NS IVF symptoms improved.  -Start p.o.'s and encourage p.o. intake.  -Monitor BP off HCTZ.  At this point he does not appear she needs to start a second antihypertensive,     BP ranges in the hospital over the last 24 hours 105-145.   -Recheck sodium, potassium in a.m.    Recommend outpatient Clinic follow-up of BP, in addition patient states she just purchased a home ambulatory BP monitor, encouraged use.   -Document in Epic marked hyponatremia with HCTZ  use in Allergy\"/adverse reaction\" [second occurrence with symptomatic hyponatremia.].       Hypokalemia -- related to the hydrochlorothiazide  Repleted, HCTZ discontinued, see above.     Active Problems:    Benign essential hypertension              -- continue Lisinopril 20 mg daily, will add Norvasc 5 mg bid PRN (and if needed continue 5 to 10 mg to treat hypertension) off HCTZ SBP ranges 105-144, continue to hold off starting second antihypertensive, recommend outpatient Clinic follow-up.  As above, patient just purchased home BP monitor, encourage use.         Back pain -- suspect musculoskeletal, now appears resolved.  Can not exclude that she passed a kidney stone, but pain was mild, and bilateral.  UA negative in ED.       Smoker -- discussed smoking cessation    DVT Prophylaxis: Pneumatic Compression Devices  Code Status: Full Code  Expected discharge: Tomorrow, recommended to prior living arrangement once taking in p.o.'s, BP stable on changing antihypertensives.    Jake Cabezas MD  Text " Page (7am - 6pm, M-F)    Interval History   No further dizziness, weakness, paresthesia.  No chest pain, dyspnea, lightheadedness.  Eating, taking in fluids.    SH: No tobacco    FH: Lives with .    ROS: Complete ROS negative except as above.      Physical Exam   Temp: 98  F (36.7  C) Temp src: Oral BP: 105/61 Pulse: 67   Resp: 18 SpO2: 99 % O2 Device: None (Room air)    Vitals:    08/11/20 1238   Weight: 56.7 kg (125 lb)     Vital Signs with Ranges  Temp:  [98  F (36.7  C)-98.2  F (36.8  C)] 98  F (36.7  C)  Pulse:  [67-78] 67  Resp:  [16-18] 18  BP: (105-171)/(61-80) 105/61  SpO2:  [96 %-99 %] 99 %  I/O last 3 completed shifts:  In: 545 [I.V.:545]  Out: -     General/Constitutional:  No acute distress, alert, talkative, cooperative.  HEENT/Head Exam:  atraumatic  Eyes:  PERRL, no conjunctivits  Mouth/Oral Pharynx:  Buccal mucosa WNL  Chest/Respiratory:  Air exchange bilateral lung fields; no rales or wheeze. Respiration nonlabored.  Cardiovascular: No murmur appreciated.  LE edema none  Gastrointestinal/Abdomen:  soft, nontender,  Musculoskeletal:  extremities warm, dry, noncyanotic; no acitve synovitis.  Neurologic:  gross  motor testingnonfocal.  Sensation grossly tested intact.  Psychiatric:  Mental status:  Alert, oriented, affect appropriate  Skin:  No rash noted on gross exam    Medications       aspirin  81 mg Oral Daily     levothyroxine  100 mcg Oral QAM AC     lisinopril  20 mg Oral Daily     simvastatin  10 mg Oral At Bedtime       Data   Recent Labs   Lab 08/12/20  0607 08/12/20  0203 08/11/20  2257 08/11/20  1625   WBC  --   --   --  10.5   HGB  --   --   --  13.4   MCV  --   --   --  93   PLT  --   --   --  476*    133 134 122*   POTASSIUM 4.6  --   --  3.2*   CHLORIDE 106  --   --  89*   CO2 28  --   --  27   BUN 6*  --   --  7   CR 0.49*  --   --  0.46*   ANIONGAP 2*  --   --  6   CHI 9.0  --   --  9.3   *  --   --  120*   ALBUMIN  --   --   --  4.2   PROTTOTAL  --   --   --   7.3   BILITOTAL  --   --   --  0.4   ALKPHOS  --   --   --  77   ALT  --   --   --  29   AST  --   --   --  17   TROPI  --   --   --  <0.015

## 2020-08-12 NOTE — PROVIDER NOTIFICATION
MD Notification    Notified Person: MD    Notified Person Name:Dr. Zazueta    Notification Date/Time: 8/12/20 0035    Notification Interaction: Talked on phone    Purpose of Notification: Increase in sodium from 122-134    Orders Received: IVF and Na rechecks    Comments:

## 2020-08-12 NOTE — PLAN OF CARE
DATE & TIME: 8/11/2020 1915-0730  Cognitive Concerns/ Orientation: A&Ox4  BEHAVIOR & AGGRESSION TOOL COLOR: Green  CIWA SCORE: N/A  ABNL VS/O2: VSS, on RA  MOBILITY: Independent  PAIN MANAGMENT: C/o back discomfort and headache 4/10, PRN Tylenol x1.  DIET: NPO  BOWEL/BLADDER: Continent  ABNL LAB/BG: Na 122-134-133; K-3.2-replaced, recheck this morning.  DRAIN/DEVICES: PIV R forearm infusing D5 at 100/hr.  TELEMETRY RHYTHM: N/A  SKIN: Intact  TESTS/PROCEDURES: N/A  D/C DAY/GOALS/PLACE: Discharge pending lab improvement  OTHER IMPORTANT INFO: Denies dizziness at this time. Sodium recheck 134, normal saline stopped per MD note, started on D5.

## 2020-08-12 NOTE — PROVIDER NOTIFICATION
MD Notification    Notified Person: MD    Notified Person Name:Dr. Cabezas    Notification Date/Time:8/12/2020 @24076    Notification Interaction: talked on phone    Purpose of Notification: pts sodium level increased to 136 from 133.    Orders Received: Stop D5, resume regular diet.    Comments:

## 2020-08-12 NOTE — PLAN OF CARE
DATE & TIME: 8/12/2020 7-3pm  Cognitive Concerns/ Orientation: A&Ox4  BEHAVIOR & AGGRESSION TOOL COLOR: Green  CIWA SCORE: N/A  ABNL VS/O2: VSS, on RA  MOBILITY: Independent  PAIN MANAGMENT: Denies  DIET: Regular  BOWEL/BLADDER: Continent  ABNL LAB/BG: Na 136 K-3.6  DRAIN/DEVICES: PIV SL  TELEMETRY RHYTHM: N/A  SKIN: Intact  TESTS/PROCEDURES: N/A  D/C DAY/GOALS/PLACE: Home tomorrow  OTHER IMPORTANT INFO: Denies dizziness at this time.

## 2020-08-12 NOTE — PROVIDER NOTIFICATION
MD Notification    Notified Person: MD    Notified Person Name: Dr Cabezas    Notification Date/Time:8/12/20 @1519    Notification Interaction:web page    Purpose of Notification:She was just telling me that she feels little nauseated, almost like when she got admitted, she's wondering if we should check her sodium level?    Orders Received:not at this time    Comments:

## 2020-08-13 VITALS
HEART RATE: 79 BPM | DIASTOLIC BLOOD PRESSURE: 70 MMHG | TEMPERATURE: 98.6 F | SYSTOLIC BLOOD PRESSURE: 149 MMHG | RESPIRATION RATE: 17 BRPM | HEIGHT: 65 IN | BODY MASS INDEX: 20.83 KG/M2 | OXYGEN SATURATION: 95 % | WEIGHT: 125 LBS

## 2020-08-13 LAB
ANION GAP SERPL CALCULATED.3IONS-SCNC: 5 MMOL/L (ref 3–14)
BUN SERPL-MCNC: 12 MG/DL (ref 7–30)
CALCIUM SERPL-MCNC: 9.4 MG/DL (ref 8.5–10.1)
CHLORIDE SERPL-SCNC: 103 MMOL/L (ref 94–109)
CO2 SERPL-SCNC: 26 MMOL/L (ref 20–32)
CREAT SERPL-MCNC: 0.62 MG/DL (ref 0.52–1.04)
GFR SERPL CREATININE-BSD FRML MDRD: >90 ML/MIN/{1.73_M2}
GLUCOSE SERPL-MCNC: 66 MG/DL (ref 70–99)
POTASSIUM SERPL-SCNC: 4.2 MMOL/L (ref 3.4–5.3)
SODIUM SERPL-SCNC: 134 MMOL/L (ref 133–144)

## 2020-08-13 PROCEDURE — 99217 ZZC OBSERVATION CARE DISCHARGE: CPT | Performed by: HOSPITALIST

## 2020-08-13 PROCEDURE — 25000132 ZZH RX MED GY IP 250 OP 250 PS 637: Performed by: INTERNAL MEDICINE

## 2020-08-13 PROCEDURE — 36415 COLL VENOUS BLD VENIPUNCTURE: CPT | Performed by: HOSPITALIST

## 2020-08-13 PROCEDURE — 80048 BASIC METABOLIC PNL TOTAL CA: CPT | Performed by: HOSPITALIST

## 2020-08-13 PROCEDURE — G0378 HOSPITAL OBSERVATION PER HR: HCPCS

## 2020-08-13 RX ADMIN — LEVOTHYROXINE SODIUM 100 MCG: 100 TABLET ORAL at 06:33

## 2020-08-13 RX ADMIN — LISINOPRIL 20 MG: 20 TABLET ORAL at 08:19

## 2020-08-13 RX ADMIN — ASPIRIN 81 MG 81 MG: 81 TABLET ORAL at 08:19

## 2020-08-13 NOTE — PLAN OF CARE
Pt is A&Ox4, VSS on RA. Denies pain. Ind. Reg diet. Na+ 134. BG 66, Pt ate right after blood draw and will give her an apple juice. Pt discharging home. Went over discharge paperwork, all questions answered. Pt left with all belongings.

## 2020-08-13 NOTE — DISCHARGE SUMMARY
"[facility]    Discharge Summary  Hospitalist    Date of Admission:  8/11/2020  Date of Discharge:  8/13/2020  Discharging Provider: Jake Cabezas MD  Date of Service (when I saw the patient): 08/13/20    History of Present Illness   Debbie Minaya is an 65 year old female who presented with  PMH hypertension presents with dizziness.    Hospital Course   Debbie Minaya was admitted on 8/11/2020.  The following problems were addressed during her hospitalization:       Hyponatremia --  2nd to hydrochlorothiazide    By patient's report this is second time she had symptomatic hyponatremia with use of HCTZ.  Patient had similar symptoms of dizziness, weakness and paresthesia.  With discontinuation of HCTZ and correction of sodium with NS IVF symptoms improved.  -Start p.o.'s and encourage p.o. intake.  -Monitor BP off HCTZ.  At this point he does not appear she needs to start a second antihypertensive,     BP ranges in the hospital over the last 24 hours 105-145.    Recommend outpatient Clinic follow-up of BP, in addition patient states she just purchased a home ambulatory BP monitor, encouraged use.   -Document in Epic marked hyponatremia with HCTZ  use in Allergy\"/adverse reaction\" [second occurrence with symptomatic hyponatremia.].  -Patient discharged to home today with follow-up with PCP for BP check and repeat BMP.  Sodium at discharge 134, potassium 4.2.  -Patient has home blood pressure monitor which she was advised to check her blood pressure and discuss daily BPs with her PCP on follow-up.       Hypokalemia -- related to the hydrochlorothiazide  Repleted, HCTZ discontinued, see above.     Active Problems:    Benign essential hypertension              -- continue Lisinopril 20 mg daily, SBP range 105-145, continue to hold off starting second antihypertensive, recommend outpatient Clinic follow-up.  As above, patient just purchased home BP monitor, encourage use.          Back pain -- " suspect musculoskeletal, now appears resolved.   UA negative in ED.       Smoker -- discussed smoking cessation and tobacco cessation OTC products, discussed 2 components of tobacco cessation including nicotine addiction and the habit of smoking.    Code Status   Full Code       Primary Care Physician   [facility]    Physical Exam   Temp: 98.6  F (37  C) Temp src: Oral BP: (!) 149/70 Pulse: 79   Resp: 17 SpO2: 95 % O2 Device: None (Room air)    Vitals:    08/11/20 1238   Weight: 56.7 kg (125 lb)     Vital Signs with Ranges  Temp:  [97.1  F (36.2  C)-98.6  F (37  C)] 98.6  F (37  C)  Pulse:  [63-79] 79  Resp:  [14-17] 17  BP: (136-149)/(70-84) 149/70  SpO2:  [95 %-98 %] 95 %  I/O last 3 completed shifts:  In: 240 [P.O.:240]  Out: -     General/Constitutional:  No acute distress,  alert, calm, cooperative.  HEENT/Head Exam:  atraumatic  Eyes:  PERRL, no conjunctivits  Mouth/Oral Pharynx:  Buccal mucosa WNL  Chest/Respiratory:  Air exchange bilateral lung fields; no rales or wheeze. Respiration nonlabored.  Cardiovascular: No murmur appreciated.  LE edema none  Gastrointestinal/Abdomen:  soft, nontender,  Musculoskeletal:  extremities warm, dry, noncyanotic; no acitve synovitis.  Neurologic:  gross  motor testingnonfocal.  Sensation grossly tested intact.  Psychiatric:  Mental status:  Alert, oriented, affect appropriate  Skin:  No rash noted on gross exam    Discharge Disposition   Discharged to home  Condition at discharge: Good    Consultations This Hospital Stay   None    Time Spent on this Encounter   IJake MD, personally saw the patient today and spent greater than 30 minutes discharging this patient.    Discharge Orders      Reason for your hospital stay    Presented with dizziness and low Sodium level     Follow-up and recommended labs and tests     Follow up with primary care provider, [facility], within 7 days for hospital follow- up.  The following labs/tests are  recommended: BMP and BP check to assess if any additional BP medications are needed    Please consider start of nicotine patch for tobacco cessation,  This is an over-the-counter product. .    Please start monitoring your home blood pressure with your blood pressure monitor and record; take or discuss with your Clinic provider.     Activity    Your activity upon discharge: activity as tolerated     Full Code     Diet    Follow this diet upon discharge: Orders Placed This Encounter      Regular Diet Adult; recommend low fat food choices as part of heart healthy diet     Discharge Medications   Discharge Medication List as of 8/13/2020  8:12 AM      CONTINUE these medications which have NOT CHANGED    Details   acetaminophen (TYLENOL) 325 MG tablet Take 325-650 mg by mouth every 6 hours as needed for mild pain, Historical      aspirin (ASA) 81 MG chewable tablet Take 81 mg by mouth daily, Historical      gabapentin (NEURONTIN) 300 MG capsule Take 300 mg by mouth 3 times daily as needed , Historical      levothyroxine (SYNTHROID) 100 MCG tablet Take 100 mcg by mouth daily , Historical      lisinopril (ZESTRIL) 20 MG tablet Take 20 mg by mouth daily , Historical      simvastatin (ZOCOR) 10 MG tablet Take 10 mg by mouth At Bedtime , Historical      tiZANidine (ZANAFLEX) 4 MG tablet TAKE 1 TABLET BY MOUTH TWICE DAILY AS NEEDED, Historical      traMADol (ULTRAM) 50 MG tablet Take 50 mg by mouth every 6 hours as needed , Historical         STOP taking these medications       hydrochlorothiazide (MICROZIDE) 12.5 MG capsule Comments:   Reason for Stopping:             Allergies   Allergies   Allergen Reactions     Thiazide-Type Diuretics Other (See Comments)     Hyponatremia       Data   Most Recent 3 CBC's:  Recent Labs   Lab Test 08/11/20  1625   WBC 10.5   HGB 13.4   MCV 93   *      Most Recent 3 BMP's:  Recent Labs   Lab Test 08/13/20  0934 08/12/20  1657 08/12/20  0607    134 136   POTASSIUM 4.2 4.1 4.6    CHLORIDE 103 103 106   CO2 26 26 28   BUN 12 15 6*   CR 0.62 0.59 0.49*   ANIONGAP 5 5 2*   CHI 9.4 9.2 9.0   GLC 66* 97 101*     Most Recent 2 LFT's:  Recent Labs   Lab Test 08/11/20  1625   AST 17   ALT 29   ALKPHOS 77   BILITOTAL 0.4     Most Recent INR's and Anticoagulation Dosing History:  Anticoagulation Dose History     There is no flowsheet data to display.        Most Recent 3 Troponin's:  Recent Labs   Lab Test 08/11/20  1625   TROPI <0.015     Most Recent Cholesterol Panel:No lab results found.  Most Recent 6 Bacteria Isolates From Any Culture (See EPIC Reports for Culture Details):No lab results found.  Most Recent TSH, T4 and A1c Labs:  Recent Labs   Lab Test 08/11/20  1625   TSH 2.28

## 2020-08-13 NOTE — PLAN OF CARE
DATE & TIME: 8/12/2020 9824-9784  Cognitive Concerns/ Orientation: A&Ox4  BEHAVIOR & AGGRESSION TOOL COLOR: Green  CIWA SCORE: N/A  ABNL VS/O2: VSS, on RA ex HTN  MOBILITY: Independent  PAIN MANAGMENT: Denies  DIET: Regular  BOWEL/BLADDER: Continent  ABNL LAB/BG: Na 134 K-4.1  DRAIN/DEVICES: PIV SL  TELEMETRY RHYTHM: N/A  SKIN: Intact  TESTS/PROCEDURES: N/A  D/C DAY/GOALS/PLACE: Home tomorrow prior to 11am  OTHER IMPORTANT INFO: Denies dizziness at this time. PRN gabapentin added for RLS.

## 2020-08-13 NOTE — PLAN OF CARE
Alert and oriented x 4. VSS on RA. Tolerating regular diet. Up independently. PIV SL. Skin intact. PRN norvasc if SBP above 180. Plan to discharge tomorrow prior to 11 am as long as morning labs are WNL.

## 2020-08-13 NOTE — PROGRESS NOTES
Observation Note:     -diagnostic tests and consults completed and resulted. Met.   -vital signs normal or at patient baseline. Met.     Nurse to notify provider when observation goals have been met and patient is ready for discharge.

## 2020-08-18 ENCOUNTER — HOSPITAL ENCOUNTER (EMERGENCY)
Facility: CLINIC | Age: 65
Discharge: HOME | End: 2020-08-18
Attending: EMERGENCY MEDICINE | Admitting: EMERGENCY MEDICINE
Payer: COMMERCIAL

## 2020-08-18 ENCOUNTER — APPOINTMENT (OUTPATIENT)
Dept: CT IMAGING | Facility: CLINIC | Age: 65
End: 2020-08-18
Attending: EMERGENCY MEDICINE
Payer: COMMERCIAL

## 2020-08-18 VITALS
OXYGEN SATURATION: 99 % | HEIGHT: 64 IN | SYSTOLIC BLOOD PRESSURE: 176 MMHG | HEART RATE: 78 BPM | DIASTOLIC BLOOD PRESSURE: 93 MMHG | BODY MASS INDEX: 21.46 KG/M2 | TEMPERATURE: 98.5 F | RESPIRATION RATE: 16 BRPM

## 2020-08-18 DIAGNOSIS — R03.0 ELEVATED BLOOD PRESSURE READING: ICD-10-CM

## 2020-08-18 DIAGNOSIS — M54.9 BACK PAIN, UNSPECIFIED BACK LOCATION, UNSPECIFIED BACK PAIN LATERALITY, UNSPECIFIED CHRONICITY: ICD-10-CM

## 2020-08-18 LAB
ALBUMIN SERPL-MCNC: 4 G/DL (ref 3.4–5)
ALBUMIN UR-MCNC: NEGATIVE MG/DL
ALP SERPL-CCNC: 65 U/L (ref 40–150)
ALT SERPL W P-5'-P-CCNC: 30 U/L (ref 0–50)
ANION GAP SERPL CALCULATED.3IONS-SCNC: 6 MMOL/L (ref 3–14)
APPEARANCE UR: CLEAR
AST SERPL W P-5'-P-CCNC: 12 U/L (ref 0–45)
BASOPHILS # BLD AUTO: 0 10E9/L (ref 0–0.2)
BASOPHILS NFR BLD AUTO: 0.4 %
BILIRUB SERPL-MCNC: 0.4 MG/DL (ref 0.2–1.3)
BILIRUB UR QL STRIP: NEGATIVE
BUN SERPL-MCNC: 11 MG/DL (ref 7–30)
CALCIUM SERPL-MCNC: 9.5 MG/DL (ref 8.5–10.1)
CHLORIDE SERPL-SCNC: 106 MMOL/L (ref 94–109)
CO2 SERPL-SCNC: 23 MMOL/L (ref 20–32)
COLOR UR AUTO: ABNORMAL
CREAT SERPL-MCNC: 0.52 MG/DL (ref 0.52–1.04)
DIFFERENTIAL METHOD BLD: NORMAL
EOSINOPHIL # BLD AUTO: 0 10E9/L (ref 0–0.7)
EOSINOPHIL NFR BLD AUTO: 0.3 %
ERYTHROCYTE [DISTWIDTH] IN BLOOD BY AUTOMATED COUNT: 12.9 % (ref 10–15)
GFR SERPL CREATININE-BSD FRML MDRD: >90 ML/MIN/{1.73_M2}
GLUCOSE SERPL-MCNC: 93 MG/DL (ref 70–99)
GLUCOSE UR STRIP-MCNC: NEGATIVE MG/DL
HCT VFR BLD AUTO: 39.2 % (ref 35–47)
HGB BLD-MCNC: 13.2 G/DL (ref 11.7–15.7)
HGB UR QL STRIP: ABNORMAL
IMM GRANULOCYTES # BLD: 0 10E9/L (ref 0–0.4)
IMM GRANULOCYTES NFR BLD: 0.3 %
KETONES UR STRIP-MCNC: NEGATIVE MG/DL
LEUKOCYTE ESTERASE UR QL STRIP: NEGATIVE
LIPASE SERPL-CCNC: 93 U/L (ref 73–393)
LYMPHOCYTES # BLD AUTO: 1.6 10E9/L (ref 0.8–5.3)
LYMPHOCYTES NFR BLD AUTO: 22 %
MCH RBC QN AUTO: 32.4 PG (ref 26.5–33)
MCHC RBC AUTO-ENTMCNC: 33.7 G/DL (ref 31.5–36.5)
MCV RBC AUTO: 96 FL (ref 78–100)
MONOCYTES # BLD AUTO: 0.3 10E9/L (ref 0–1.3)
MONOCYTES NFR BLD AUTO: 4.2 %
NEUTROPHILS # BLD AUTO: 5.2 10E9/L (ref 1.6–8.3)
NEUTROPHILS NFR BLD AUTO: 72.8 %
NITRATE UR QL: NEGATIVE
NRBC # BLD AUTO: 0 10*3/UL
NRBC BLD AUTO-RTO: 0 /100
PH UR STRIP: 5 PH (ref 5–7)
PLATELET # BLD AUTO: 440 10E9/L (ref 150–450)
POTASSIUM SERPL-SCNC: 3.7 MMOL/L (ref 3.4–5.3)
PROT SERPL-MCNC: 6.9 G/DL (ref 6.8–8.8)
RBC # BLD AUTO: 4.07 10E12/L (ref 3.8–5.2)
RBC #/AREA URNS AUTO: 4 /HPF (ref 0–2)
SODIUM SERPL-SCNC: 135 MMOL/L (ref 133–144)
SOURCE: ABNORMAL
SP GR UR STRIP: 1 (ref 1–1.03)
UROBILINOGEN UR STRIP-MCNC: NORMAL MG/DL (ref 0–2)
WBC # BLD AUTO: 7.2 10E9/L (ref 4–11)
WBC #/AREA URNS AUTO: 0 /HPF (ref 0–5)

## 2020-08-18 PROCEDURE — 83690 ASSAY OF LIPASE: CPT | Performed by: EMERGENCY MEDICINE

## 2020-08-18 PROCEDURE — 85025 COMPLETE CBC W/AUTO DIFF WBC: CPT | Performed by: EMERGENCY MEDICINE

## 2020-08-18 PROCEDURE — 99284 EMERGENCY DEPT VISIT MOD MDM: CPT | Mod: 25

## 2020-08-18 PROCEDURE — 81001 URINALYSIS AUTO W/SCOPE: CPT | Performed by: EMERGENCY MEDICINE

## 2020-08-18 PROCEDURE — 74176 CT ABD & PELVIS W/O CONTRAST: CPT

## 2020-08-18 PROCEDURE — 80053 COMPREHEN METABOLIC PANEL: CPT | Performed by: EMERGENCY MEDICINE

## 2020-08-18 NOTE — ED AVS SNAPSHOT
Emergency Department  [address] [place] [id number]  Phone:  [phone]  Fax:  [phone]                                    Debbie Minaya   MRN: [id number]    Department:   Emergency Department   Date of Visit:  8/18/2020           After Visit Summary Signature Page    I have received my discharge instructions, and my questions have been answered. I have discussed any challenges I see with this plan with the nurse or doctor.    ..........................................................................................................................................  Patient/Patient Representative Signature      ..........................................................................................................................................  Patient Representative Print Name and Relationship to Patient    ..................................................               ................................................  Date                                   Time    ..........................................................................................................................................  Reviewed by Signature/Title    ...................................................              ..............................................  Date                                               Time          22EPIC Rev 08/18

## 2020-08-18 NOTE — ED PROVIDER NOTES
"  History     Chief Complaint:  Hypertension and Back Pain      HPI history supplemented by electronic chart review  Debbie Minaya is a 65 year old female with history of hypertension, hyperlipidemia, hypothyroidism who presents with hypertension and back pain. The patient was recently admitted to the hospital for severe hyponatremia after use of HCTZ. Today, she presents with hypertension of 185 systolic, bilateral but predominately right-sided back pain and intermittent sharp lower abdominal pain. The patient reports baseline back pain and difficulty differentiating if her current pain is a flare up or if it is a new issue. She denies dysuria or surgeries to the abdomen. She states that one week ago, she was driving and had intense abdominal pain feeling as if her bladder was filling up but plugged. She was however thereafter able to urinate. Her pain subsided after laying on a heating pad.     Allergies:  Thiazide-type diuretics      Medications:    Aspirin 81 mg   Neurontin  Synthryoid   Zestril   Zocor   Zanaflex   Ultram     Past Medical History:    Hypertension   Microscopic hematuria   Patella fracture, right  Hypokalemia   Hyponatremia    Past Surgical History:    Carpal Tunnel Release   Trigger finger release     Family History:    Lung cancer - mother   MI - father     Social History:  The patient was accompanied to the ED by .  Smoking Status: Current Every Day Smoker   Alcohol Use: Positive  Marital Status:        Review of Systems   All other systems reviewed and are negative.    Physical Exam     Patient Vitals for the past 24 hrs:   BP Temp Temp src Pulse Heart Rate Resp SpO2 Height   08/18/20 1546 (!) 176/93 -- -- -- 74 16 99 % --   08/18/20 1405 -- -- -- -- -- -- 98 % --   08/18/20 1404 (!) 173/83 -- -- 78 -- -- -- --   08/18/20 1230 (!) 185/98 98.5  F (36.9  C) Temporal 77 -- 18 99 % 1.626 m (5' 4\")       Physical Exam  General: Nontoxic appearing woman sitting upright in room 19, "  at bedside  HENT: mucous membranes moist   CV: rate as above, no lower extremity edema, normal radial pulses  Resp: normal effort, speaks in full phrases, no stridor, no cough observed  GI: Abdomen soft, nontender, no discrete masses palpable  MSK: no bony tenderness, no CVAT  Skin: appropriately warm and dry, no abdominal or flank or back rash  Neuro: alert, clear speech, oriented   Psych: cooperative      Emergency Department Course     Imaging:  Radiology findings were communicated with the patient who voiced understanding of the findings.    CT Abdomen Pelvis w/o Contrast  No acute cause for right flank pain demonstrated. Chronic   and incidental findings detailed above.     Reading per radiology.    Laboratory:  Laboratory findings were communicated with the patient who voiced understanding of the findings.    CBC: WBC 7.2, HGB 13.2,     CMP: WNL (Creatinine 0.52)    Lipase: 93     UA w/ micro: Blood moderate (A), RBC/HPF 4 (H)    Emergency Department Course:     Nursing notes and vitals reviewed.    1252 I performed an exam of the patient as documented above.     1323 IV was inserted and blood was drawn for laboratory testing, results above.    1323 The patient provided a urine sample here in the emergency department. This was sent for laboratory testing, findings above.    1350 The patient was sent for a CT while in the emergency department, results above.     1501 Patient rechecked and updated.      1514 Findings and plan explained to the Patient. Patient discharged home with instructions regarding supportive care, medications, and reasons to return. The importance of close follow-up was reviewed.     Impression & Plan      Medical Decision Making:  Given some sharp discomfort mostly in the right side of her back, consideration was given to ureteral stone, pyelonephritis, bowel obstruction, biliary colic, numerous others.  She declined any treatments here, stating that she had taken some  tramadol and Flexeril earlier today with some relief.  Musculoskeletal etiologies remain on the differential.  CT imaging does not reveal any apparent structural cause.  I do not think she requires MRI or surgical consultation.  Highly doubt aortic pathology, acknowledging her blood pressure is suboptimally controlled though this is difficult to assess in light of her acutely painful condition in combination with recent discontinuation of hydrochlorothiazide.  I explained to the patient the rationale for not making further medication adjustments at this time, instead advising her to continue checking her blood pressure regularly and bring this log to her primary physician in the coming days to reconsider her medications.  She is welcome back for crisis at any hour.  She was discharged home in improved condition in the care of her .    Diagnosis:    ICD-10-CM    1. Back pain, unspecified back location, unspecified back pain laterality, unspecified chronicity  M54.9    2. Elevated blood pressure reading  R03.0      Disposition:   Patient is discharged home.     Scribe Disclosure:  I, Chanda Iqbal, am serving as a scribe at 12:49 PM on 8/18/2020 to document services personally performed by Laureano Saucedo MD based on my observations and the provider's statements to me.   EMERGENCY DEPARTMENT    This note was completed in part using Dragon voice recognition software. Although reviewed after completion, some word and grammatical errors may occur.         Laureano Saucedo MD  08/18/20 5652

## 2022-01-29 ENCOUNTER — HOSPITAL ENCOUNTER (EMERGENCY)
Facility: CLINIC | Age: 67
Discharge: HOME | End: 2022-01-29
Attending: EMERGENCY MEDICINE | Admitting: EMERGENCY MEDICINE
Payer: COMMERCIAL

## 2022-01-29 VITALS
RESPIRATION RATE: 12 BRPM | SYSTOLIC BLOOD PRESSURE: 127 MMHG | DIASTOLIC BLOOD PRESSURE: 72 MMHG | TEMPERATURE: 98.3 F | HEART RATE: 68 BPM | BODY MASS INDEX: 21.34 KG/M2 | OXYGEN SATURATION: 98 % | WEIGHT: 125 LBS | HEIGHT: 64 IN

## 2022-01-29 DIAGNOSIS — R42 VERTIGO: ICD-10-CM

## 2022-01-29 DIAGNOSIS — J32.9 SINUSITIS, UNSPECIFIED CHRONICITY, UNSPECIFIED LOCATION: ICD-10-CM

## 2022-01-29 DIAGNOSIS — R07.89 CHEST WALL PAIN: ICD-10-CM

## 2022-01-29 LAB
ANION GAP SERPL CALCULATED.3IONS-SCNC: 6 MMOL/L (ref 3–14)
ATRIAL RATE - MUSE: 70 BPM
BASOPHILS # BLD AUTO: 0.1 10E3/UL (ref 0–0.2)
BASOPHILS NFR BLD AUTO: 1 %
BUN SERPL-MCNC: 10 MG/DL (ref 7–30)
CALCIUM SERPL-MCNC: 9.2 MG/DL (ref 8.5–10.1)
CHLORIDE BLD-SCNC: 107 MMOL/L (ref 94–109)
CO2 SERPL-SCNC: 24 MMOL/L (ref 20–32)
CREAT SERPL-MCNC: 0.5 MG/DL (ref 0.52–1.04)
DIASTOLIC BLOOD PRESSURE - MUSE: NORMAL MMHG
EOSINOPHIL # BLD AUTO: 0.1 10E3/UL (ref 0–0.7)
EOSINOPHIL NFR BLD AUTO: 1 %
ERYTHROCYTE [DISTWIDTH] IN BLOOD BY AUTOMATED COUNT: 12.4 % (ref 10–15)
GFR SERPL CREATININE-BSD FRML MDRD: >90 ML/MIN/1.73M2
GLUCOSE BLD-MCNC: 189 MG/DL (ref 70–99)
HCT VFR BLD AUTO: 40.4 % (ref 35–47)
HGB BLD-MCNC: 13.5 G/DL (ref 11.7–15.7)
IMM GRANULOCYTES # BLD: 0 10E3/UL
IMM GRANULOCYTES NFR BLD: 0 %
INTERPRETATION ECG - MUSE: NORMAL
LYMPHOCYTES # BLD AUTO: 1.6 10E3/UL (ref 0.8–5.3)
LYMPHOCYTES NFR BLD AUTO: 22 %
MCH RBC QN AUTO: 31.8 PG (ref 26.5–33)
MCHC RBC AUTO-ENTMCNC: 33.4 G/DL (ref 31.5–36.5)
MCV RBC AUTO: 95 FL (ref 78–100)
MONOCYTES # BLD AUTO: 0.4 10E3/UL (ref 0–1.3)
MONOCYTES NFR BLD AUTO: 6 %
NEUTROPHILS # BLD AUTO: 4.9 10E3/UL (ref 1.6–8.3)
NEUTROPHILS NFR BLD AUTO: 70 %
NRBC # BLD AUTO: 0 10E3/UL
NRBC BLD AUTO-RTO: 0 /100
P AXIS - MUSE: 72 DEGREES
PLATELET # BLD AUTO: 385 10E3/UL (ref 150–450)
POTASSIUM BLD-SCNC: 3.4 MMOL/L (ref 3.4–5.3)
PR INTERVAL - MUSE: 128 MS
QRS DURATION - MUSE: 96 MS
QT - MUSE: 392 MS
QTC - MUSE: 423 MS
R AXIS - MUSE: 72 DEGREES
RBC # BLD AUTO: 4.25 10E6/UL (ref 3.8–5.2)
SODIUM SERPL-SCNC: 137 MMOL/L (ref 133–144)
SYSTOLIC BLOOD PRESSURE - MUSE: NORMAL MMHG
T AXIS - MUSE: 82 DEGREES
TROPONIN I SERPL HS-MCNC: 4 NG/L
VENTRICULAR RATE- MUSE: 70 BPM
WBC # BLD AUTO: 7 10E3/UL (ref 4–11)

## 2022-01-29 PROCEDURE — 250N000013 HC RX MED GY IP 250 OP 250 PS 637: Performed by: EMERGENCY MEDICINE

## 2022-01-29 PROCEDURE — 82310 ASSAY OF CALCIUM: CPT | Performed by: EMERGENCY MEDICINE

## 2022-01-29 PROCEDURE — 36415 COLL VENOUS BLD VENIPUNCTURE: CPT | Performed by: EMERGENCY MEDICINE

## 2022-01-29 PROCEDURE — 85025 COMPLETE CBC W/AUTO DIFF WBC: CPT | Performed by: EMERGENCY MEDICINE

## 2022-01-29 PROCEDURE — 99284 EMERGENCY DEPT VISIT MOD MDM: CPT

## 2022-01-29 PROCEDURE — 93005 ELECTROCARDIOGRAM TRACING: CPT

## 2022-01-29 PROCEDURE — 84484 ASSAY OF TROPONIN QUANT: CPT | Performed by: EMERGENCY MEDICINE

## 2022-01-29 RX ORDER — IBUPROFEN 400 MG/1
400 TABLET, FILM COATED ORAL ONCE
Status: COMPLETED | OUTPATIENT
Start: 2022-01-29 | End: 2022-01-29

## 2022-01-29 RX ORDER — MECLIZINE HYDROCHLORIDE 25 MG/1
25 TABLET ORAL 3 TIMES DAILY PRN
Qty: 30 TABLET | Refills: 0 | Status: SHIPPED | OUTPATIENT
Start: 2022-01-29

## 2022-01-29 RX ADMIN — IBUPROFEN 400 MG: 400 TABLET, FILM COATED ORAL at 12:45

## 2022-01-29 ASSESSMENT — MIFFLIN-ST. JEOR: SCORE: 1087

## 2022-01-29 NOTE — ED PROVIDER NOTES
Visit Date: 01/29/2022    CHIEF COMPLAINT:  Sinus congestion, dizziness, and chest discomfort.    HISTORY OF PRESENT ILLNESS:  This is a 66-year-old woman who presents to the Emergency Department for many reasons.  She was planning to go to urgent care because of her sinus issues.  She notes she has some sinus fullness and she uses a nasal flush, which seems to help.  When she puts her head back, she can get some drainage.  She also gets some dizziness at times, although this is short-lived.  She has no headache, no fevers, no chills, no pustular discharge.  She states she has tried multiple things including several courses of antibiotics, steroid sprays without much relief.  She does have an ear, nose, and throat appointment coming up in the next several days as well.  She noted this morning while she was getting ready to go to urgent care that she had chest discomfort.  She describes this as tenderness with palpation on her anterior chest wall.  She has had this before and it was consistent with costochondritis.  She does not have shortness of breath, sweats, nausea or back pain history; however, does show risk factors for cardiac disease with hypertension, hyperlipidemia, smoking, but she has had two stress tests, one recently, which were both unremarkable.    PAST MEDICAL HISTORY:    1.  Hyponatremia when she was on hydrochlorothiazide.  2.  Hypertension.  3.  Dizziness.  4.  Nausea.  5.  Back pain.  6.  Smoking.    MEDICATIONS:    1.  Tylenol.   2.  Aspirin.   3.  Neurontin.   4.  Synthroid.   5.  Zestril.  6.  Zocor.  7.  Zanaflex.  8.  Ultram.    ALLERGIES:  DIURETICS, mostly they cause hyponatremia.    FAMILY AND SOCIAL HISTORY:  Lives with her significant other.    REVIEW OF SYSTEMS:  As noted.  The patient also complains of tingling in her upper thighs, which has been present for a while.  Her chiropractor states this is due to back problems.  She is worried it could be due to an electrolyte issue.  All  other systems negative.    PHYSICAL EXAMINATION:    GENERAL:  Reveals a middle-aged white female, supine, no respiratory distress.  VITAL SIGNS:  Blood pressure 179/86, temperature 98.3, pulse 83, respirations 20, pulse oximetry 98% on room air.  HEAD AND NECK:  There is some mild tenderness on palpation of the maxillary sinuses and forehead.  There is no drainage through the nose.  Pupils equal, reactive.  Extraocular movements intact.  Oropharynx is clear.  NECK:  Supple.  There is no adenopathy.  LUNGS:  Clear.  HEART:  Regular.  No murmur, rubs, or gallops.  CHEST WALL:  There is reproducible tenderness over the anterior costochondral margins of the upper chest wall, anterior.  No redness or swelling.  Pressure here does reproduce pain.  HEART:  Heart sounds are regular.  No murmur, rubs or gallops.  ABDOMEN:  Soft, no tenderness or masses.  MUSCULOSKELETAL:  No swelling or tenderness in the legs.  SKIN:  No rash.  PSYCHIATRIC:  Slightly anxious.  LYMPHATICS:  No cervical or inguinal adenopathy.  NEUROLOGIC:  Awake, alert, appropriate.  Fluent speech.  Normal motor, sensation, and coordination.    EMERGENCY DEPARTMENT COURSE:  The patient was placed on monitors.  EKG was obtained.  This shows a normal sinus rhythm with no acute ischemic or hypertrophic changes, rate is 70, , QRS 96, QTc 423.  Labs were obtained.  White count 7, hemoglobin 13.5, platelets 385, creatinine 0.50, glucose 189, and troponin is 4.  We talked about antibiotic use for her sinus infection.  She did not want to try it at this time.  I did talk about going back to Nasacort spray in addition to nasal flushing and keeping her ENT appointment.  We talked about her transient dizziness that she sometimes has, and I recommended a trial of meclizine and will prescribe that for her.  I do not think this represents acute coronary disease, PE, or dissection.  Her chest pain is mostly chest wall discomfort.  The patient will be discharged home  with a prescription for Antivert.  She will follow up with ENT this week.  If symptoms change or worsen, recheck in the ED.    IMPRESSION:    1.  Chest wall pain.  2.  Sinusitis.  3.  Dizziness.    Roland Castellano MD        D: 2022   T: 2022   MT: ИРИНА    Name:     ANA SLOAN  MRN:      [id number]        Account:    [id number]   :      1955           Visit Date: 2022     Document: [id number]

## 2022-02-21 ENCOUNTER — HOSPITAL ENCOUNTER (EMERGENCY)
Facility: CLINIC | Age: 67
Discharge: HOME | End: 2022-02-21
Attending: NURSE PRACTITIONER | Admitting: NURSE PRACTITIONER
Payer: COMMERCIAL

## 2022-02-21 VITALS
HEIGHT: 64 IN | DIASTOLIC BLOOD PRESSURE: 74 MMHG | HEART RATE: 80 BPM | WEIGHT: 125 LBS | SYSTOLIC BLOOD PRESSURE: 178 MMHG | RESPIRATION RATE: 16 BRPM | TEMPERATURE: 97.4 F | BODY MASS INDEX: 21.34 KG/M2 | OXYGEN SATURATION: 98 %

## 2022-02-21 DIAGNOSIS — R68.89 FEELING OFF-COLOR: ICD-10-CM

## 2022-02-21 DIAGNOSIS — R42 DIZZINESS: ICD-10-CM

## 2022-02-21 PROBLEM — E78.5 HYPERLIPIDEMIA: Status: ACTIVE | Noted: 2022-02-21

## 2022-02-21 PROBLEM — E03.9 HYPOTHYROIDISM: Status: ACTIVE | Noted: 2022-02-21

## 2022-02-21 PROBLEM — G25.81 RLS (RESTLESS LEGS SYNDROME): Status: ACTIVE | Noted: 2020-07-20

## 2022-02-21 PROBLEM — I10 HTN (HYPERTENSION): Status: ACTIVE | Noted: 2022-02-21

## 2022-02-21 LAB
ALBUMIN SERPL-MCNC: 4 G/DL (ref 3.4–5)
ALP SERPL-CCNC: 58 U/L (ref 40–150)
ALT SERPL W P-5'-P-CCNC: 24 U/L (ref 0–50)
ANION GAP SERPL CALCULATED.3IONS-SCNC: 6 MMOL/L (ref 3–14)
AST SERPL W P-5'-P-CCNC: 15 U/L (ref 0–45)
ATRIAL RATE - MUSE: 65 BPM
BASOPHILS # BLD AUTO: 0.1 10E3/UL (ref 0–0.2)
BASOPHILS NFR BLD AUTO: 1 %
BILIRUB SERPL-MCNC: 0.4 MG/DL (ref 0.2–1.3)
BUN SERPL-MCNC: 14 MG/DL (ref 7–30)
CALCIUM SERPL-MCNC: 9.4 MG/DL (ref 8.5–10.1)
CHLORIDE BLD-SCNC: 107 MMOL/L (ref 94–109)
CO2 SERPL-SCNC: 26 MMOL/L (ref 20–32)
CREAT SERPL-MCNC: 0.64 MG/DL (ref 0.52–1.04)
DIASTOLIC BLOOD PRESSURE - MUSE: NORMAL MMHG
EOSINOPHIL # BLD AUTO: 0.1 10E3/UL (ref 0–0.7)
EOSINOPHIL NFR BLD AUTO: 1 %
ERYTHROCYTE [DISTWIDTH] IN BLOOD BY AUTOMATED COUNT: 12.6 % (ref 10–15)
GFR SERPL CREATININE-BSD FRML MDRD: >90 ML/MIN/1.73M2
GLUCOSE BLD-MCNC: 91 MG/DL (ref 70–99)
HCT VFR BLD AUTO: 39.4 % (ref 35–47)
HGB BLD-MCNC: 12.9 G/DL (ref 11.7–15.7)
HOLD SPECIMEN: NORMAL
IMM GRANULOCYTES # BLD: 0 10E3/UL
IMM GRANULOCYTES NFR BLD: 0 %
INTERPRETATION ECG - MUSE: NORMAL
LYMPHOCYTES # BLD AUTO: 2.2 10E3/UL (ref 0.8–5.3)
LYMPHOCYTES NFR BLD AUTO: 23 %
MCH RBC QN AUTO: 32 PG (ref 26.5–33)
MCHC RBC AUTO-ENTMCNC: 32.7 G/DL (ref 31.5–36.5)
MCV RBC AUTO: 98 FL (ref 78–100)
MONOCYTES # BLD AUTO: 0.6 10E3/UL (ref 0–1.3)
MONOCYTES NFR BLD AUTO: 7 %
NEUTROPHILS # BLD AUTO: 6.3 10E3/UL (ref 1.6–8.3)
NEUTROPHILS NFR BLD AUTO: 68 %
NRBC # BLD AUTO: 0 10E3/UL
NRBC BLD AUTO-RTO: 0 /100
P AXIS - MUSE: 71 DEGREES
PLATELET # BLD AUTO: 410 10E3/UL (ref 150–450)
POTASSIUM BLD-SCNC: 3.6 MMOL/L (ref 3.4–5.3)
PR INTERVAL - MUSE: 140 MS
PROT SERPL-MCNC: 6.9 G/DL (ref 6.8–8.8)
QRS DURATION - MUSE: 90 MS
QT - MUSE: 410 MS
QTC - MUSE: 426 MS
R AXIS - MUSE: 63 DEGREES
RBC # BLD AUTO: 4.03 10E6/UL (ref 3.8–5.2)
SODIUM SERPL-SCNC: 139 MMOL/L (ref 133–144)
SYSTOLIC BLOOD PRESSURE - MUSE: NORMAL MMHG
T AXIS - MUSE: 67 DEGREES
TROPONIN I SERPL HS-MCNC: 4 NG/L
VENTRICULAR RATE- MUSE: 65 BPM
WBC # BLD AUTO: 9.3 10E3/UL (ref 4–11)

## 2022-02-21 PROCEDURE — 84484 ASSAY OF TROPONIN QUANT: CPT | Performed by: EMERGENCY MEDICINE

## 2022-02-21 PROCEDURE — 96361 HYDRATE IV INFUSION ADD-ON: CPT

## 2022-02-21 PROCEDURE — 80053 COMPREHEN METABOLIC PANEL: CPT | Performed by: EMERGENCY MEDICINE

## 2022-02-21 PROCEDURE — 84484 ASSAY OF TROPONIN QUANT: CPT | Performed by: NURSE PRACTITIONER

## 2022-02-21 PROCEDURE — 93005 ELECTROCARDIOGRAM TRACING: CPT

## 2022-02-21 PROCEDURE — 80053 COMPREHEN METABOLIC PANEL: CPT | Performed by: NURSE PRACTITIONER

## 2022-02-21 PROCEDURE — 36415 COLL VENOUS BLD VENIPUNCTURE: CPT | Performed by: EMERGENCY MEDICINE

## 2022-02-21 PROCEDURE — 258N000003 HC RX IP 258 OP 636: Performed by: NURSE PRACTITIONER

## 2022-02-21 PROCEDURE — 96374 THER/PROPH/DIAG INJ IV PUSH: CPT

## 2022-02-21 PROCEDURE — 250N000011 HC RX IP 250 OP 636: Performed by: NURSE PRACTITIONER

## 2022-02-21 PROCEDURE — 250N000013 HC RX MED GY IP 250 OP 250 PS 637: Performed by: NURSE PRACTITIONER

## 2022-02-21 PROCEDURE — 99284 EMERGENCY DEPT VISIT MOD MDM: CPT | Mod: 25

## 2022-02-21 PROCEDURE — 85025 COMPLETE CBC W/AUTO DIFF WBC: CPT | Performed by: NURSE PRACTITIONER

## 2022-02-21 PROCEDURE — 85025 COMPLETE CBC W/AUTO DIFF WBC: CPT | Performed by: EMERGENCY MEDICINE

## 2022-02-21 RX ORDER — MECLIZINE HYDROCHLORIDE 25 MG/1
25 TABLET ORAL ONCE
Status: COMPLETED | OUTPATIENT
Start: 2022-02-21 | End: 2022-02-21

## 2022-02-21 RX ORDER — KETOROLAC TROMETHAMINE 15 MG/ML
15 INJECTION, SOLUTION INTRAMUSCULAR; INTRAVENOUS ONCE
Status: COMPLETED | OUTPATIENT
Start: 2022-02-21 | End: 2022-02-21

## 2022-02-21 RX ADMIN — SODIUM CHLORIDE 1000 ML: 9 INJECTION, SOLUTION INTRAVENOUS at 14:14

## 2022-02-21 RX ADMIN — KETOROLAC TROMETHAMINE 15 MG: 15 INJECTION, SOLUTION INTRAMUSCULAR; INTRAVENOUS at 14:12

## 2022-02-21 RX ADMIN — MECLIZINE HYDROCHLORIDE 25 MG: 25 TABLET ORAL at 14:12

## 2022-02-21 ASSESSMENT — ENCOUNTER SYMPTOMS
FEVER: 0
NECK PAIN: 1
VOMITING: 0
SHORTNESS OF BREATH: 0
HEADACHES: 1
PHOTOPHOBIA: 0
NUMBNESS: 0
NAUSEA: 1
DIZZINESS: 1
WEAKNESS: 0

## 2022-02-21 NOTE — ED PROVIDER NOTES
"  History   Chief Complaint:  Dizziness     HPI   Debbie Minaya is a 67 year old female with history of hypertesion who presents for evaluation of dizziness. On 1/29/22 the patient was seen in the ED for sinus pressure with associated dizziness. At that time her symptoms were attributed to sinusitis, they decided not to do antibiotics, and she was prescribed Meclizine for her dizziness. She has had some improvement of her symptoms with the Meclizine. Since then she has followed up with ENT. She has continued to struggle with intermittent dizziness with some associated nausea, neck pain, and a headache. She has run out of the Meclizine and presents to the ED with primary concern for her ongoing dizziness. She has not had any fever or vomiting.     Review of Systems   Constitutional: Negative for fever.   Eyes: Negative for photophobia and visual disturbance.   Respiratory: Negative for shortness of breath.    Cardiovascular: Negative for chest pain.   Gastrointestinal: Positive for nausea. Negative for vomiting.   Musculoskeletal: Positive for neck pain.   Neurological: Positive for dizziness and headaches. Negative for weakness and numbness.   All other systems reviewed and are negative.      Allergies:  Thiazide-Type Diuretics    Medications:  Tylenol  Aspirin  Gabapentin  Levothyroxine  Lisinopril  Meclizine  Simvastatin  Tizanidine   Tramadol     Past Medical History:     Hypertension   Hypothyroidism  Restless legs syndrome  Hyperlipidemia       Past Surgical History:    Carpal tunnel release, bilateral   Release trigger finger, bilateral      Family History:    Lung cancer - Mother  Myocardial infarction - Father     Social History:  The patient presents to the ED alone.     Physical Exam     Patient Vitals for the past 24 hrs:   BP Temp Pulse Resp SpO2 Height Weight   02/21/22 1606 (!) 178/74 -- 80 16 98 % -- --   02/21/22 1147 (!) 189/70 97.4  F (36.3  C) 81 18 100 % 1.626 m (5' 4\") 56.7 kg (125 lb) "       Physical Exam  General:  Alert, No obvious discomfort, well kept  HEENT:  Normal Voice, PERRL, EOM normal, oropharynx is normal, Uvula is midline, Conjunctivae and sclerae are normal, No lymphadenopathy   Neck: Normal range of motion, No meningismus. There is no midline cervical spine pain/tenderness. No mass is detected  CV:  Regular rate and underlying rhythm, Normal Peripheral pulses. No murmurs, rubs or clicks  Resp: Lungs are clear, No tachypnea, Non-labored breathing, No wheezing, crackles, or rales   GI:  Abdomen is soft, there is no rigidity, No distension, No tympani, No rebound tenderness, Non-surgical without peritoneal features    MS:  No major joint effusions, No asymmetric leg swelling. No calf tenderness  Skin:  No rash or acute skin lesions noted, Warm, Dry  Neuro: Alert and oriented to person/place and time.  Cranial nerves II through XII grossly intact, Normal strength and sensation, follows commands, responds appropriately.  Psych: Awake. Alert.  Normal affect.  Appropriate interactions. Good eye contact    Emergency Department Course   ECG  ECG obtained at 14:52:51, ECG read at 1500  Normal sinus rhythm, Normal ECG.  Rate 65 bpm. PA interval 140 ms. QRS duration 90 ms. QT/QTc 410/426 ms. P-R-T axes 71 63 67.     Laboratory:  Labs Ordered and Resulted from Time of ED Arrival to Time of ED Departure   COMPREHENSIVE METABOLIC PANEL - Normal       Result Value    Sodium 139      Potassium 3.6      Chloride 107      Carbon Dioxide (CO2) 26      Anion Gap 6      Urea Nitrogen 14      Creatinine 0.64      Calcium 9.4      Glucose 91      Alkaline Phosphatase 58      AST 15      ALT 24      Protein Total 6.9      Albumin 4.0      Bilirubin Total 0.4      GFR Estimate >90     TROPONIN I - Normal    Troponin I High Sensitivity 4     CBC WITH PLATELETS AND DIFFERENTIAL    WBC Count 9.3      RBC Count 4.03      Hemoglobin 12.9      Hematocrit 39.4      MCV 98      MCH 32.0      MCHC 32.7      RDW 12.6   "    Platelet Count 410      % Neutrophils 68      % Lymphocytes 23      % Monocytes 7      % Eosinophils 1      % Basophils 1      % Immature Granulocytes 0      NRBCs per 100 WBC 0      Absolute Neutrophils 6.3      Absolute Lymphocytes 2.2      Absolute Monocytes 0.6      Absolute Eosinophils 0.1      Absolute Basophils 0.1      Absolute Immature Granulocytes 0.0      Absolute NRBCs 0.0        Emergency Department Course:     Reviewed:  I reviewed nursing notes, vitals and past medical history    Assessments:  1354: I obtained history and examined the patient as noted above.     1528: I updated and reassessed the patient.     Interventions:  1412 Toradol 15 mg IV   1412 Meclizine 25 mg PO  1414 NS 1,000 mL IV     Disposition:  The patient was discharged to home.     Impression & Plan     Medical Decision Making:  Debbie Minaya is a 67 year old female who presents today for evaluation of ongoing symptoms of vague dizziness and \"feeling off.\"  She has been seen for this before and has had intermittent symptoms for the last several weeks.  She does not have any associated focal neurologic deficits on examination.  She does have improvement with meclizine.  I suspect mild vertigo.  She also has some spinal canal issues for which she is working with orthopedics.  Another possibility for her vague symptoms could be a demyelinating disease.  She has not followed up with neurology.  She has seen ENT who did not find any obvious source of her feelings of dizziness.  She does not describe actual nausea just feels \"kind of off.\"  There was no indication for advanced imaging at this time.  She is advised on appropriate follow-up.  I doubt CVA given her ongoing waxing and waning symptoms.  Also with the relief of symptoms from meclizine this does point towards vertigo.  I have given her referral to vestibular rehab.  Advised her to follow-up with neurology she will continue to follow-up with her back specialist.  No " indication for further testing or imaging at this time.  Her laboratory studies were noncontributory.  No evidence of ACS.  She appears to be safe and appropriate for outpatient meds follow-up and is discharged home.       Diagnosis:    ICD-10-CM    1. Dizziness  R42 Physical Therapy Referral   2. Feeling off-color  R68.89        Discharge Medications:  Discharge Medication List as of 2/21/2022  4:01 PM          Scribe Disclosure:  I, Darrick Holguin, am serving as a scribe at 1:54 PM on 2/21/2022 to document services personally performed by Javier Villegas APRN based on my observations and the provider's statements to me.           Javier Villegas APRN CNP  02/21/22 [id number]

## 2022-02-21 NOTE — DISCHARGE INSTRUCTIONS
You can take over-the-counter meclizine also known as Antivert.  Use as directed.  Keep yourself well-hydrated.  Follow-up with your back specialist.  You may want to consult neurology as well.  I have placed a vestibular rehab order in the system for you.

## 2022-02-26 ENCOUNTER — HOSPITAL ENCOUNTER (EMERGENCY)
Facility: CLINIC | Age: 67
Discharge: HOME | End: 2022-02-26
Attending: EMERGENCY MEDICINE | Admitting: EMERGENCY MEDICINE
Payer: COMMERCIAL

## 2022-02-26 ENCOUNTER — APPOINTMENT (OUTPATIENT)
Dept: CT IMAGING | Facility: CLINIC | Age: 67
End: 2022-02-26
Attending: EMERGENCY MEDICINE
Payer: COMMERCIAL

## 2022-02-26 VITALS
HEART RATE: 80 BPM | OXYGEN SATURATION: 99 % | RESPIRATION RATE: 18 BRPM | SYSTOLIC BLOOD PRESSURE: 177 MMHG | HEIGHT: 64 IN | DIASTOLIC BLOOD PRESSURE: 85 MMHG | WEIGHT: 125 LBS | TEMPERATURE: 98.3 F | BODY MASS INDEX: 21.34 KG/M2

## 2022-02-26 DIAGNOSIS — R11.0 NAUSEA: ICD-10-CM

## 2022-02-26 DIAGNOSIS — R51.9 INTRACTABLE HEADACHE, UNSPECIFIED CHRONICITY PATTERN, UNSPECIFIED HEADACHE TYPE: ICD-10-CM

## 2022-02-26 LAB
ALBUMIN SERPL-MCNC: 4.2 G/DL (ref 3.4–5)
ALP SERPL-CCNC: 67 U/L (ref 40–150)
ALT SERPL W P-5'-P-CCNC: 30 U/L (ref 0–50)
ANION GAP SERPL CALCULATED.3IONS-SCNC: 6 MMOL/L (ref 3–14)
AST SERPL W P-5'-P-CCNC: 16 U/L (ref 0–45)
BASOPHILS # BLD AUTO: 0.1 10E3/UL (ref 0–0.2)
BASOPHILS NFR BLD AUTO: 1 %
BILIRUB SERPL-MCNC: 0.5 MG/DL (ref 0.2–1.3)
BUN SERPL-MCNC: 9 MG/DL (ref 7–30)
CALCIUM SERPL-MCNC: 9.7 MG/DL (ref 8.5–10.1)
CHLORIDE BLD-SCNC: 105 MMOL/L (ref 94–109)
CO2 SERPL-SCNC: 27 MMOL/L (ref 20–32)
CREAT SERPL-MCNC: 0.55 MG/DL (ref 0.52–1.04)
EOSINOPHIL # BLD AUTO: 0 10E3/UL (ref 0–0.7)
EOSINOPHIL NFR BLD AUTO: 1 %
ERYTHROCYTE [DISTWIDTH] IN BLOOD BY AUTOMATED COUNT: 12.5 % (ref 10–15)
GFR SERPL CREATININE-BSD FRML MDRD: >90 ML/MIN/1.73M2
GLUCOSE BLD-MCNC: 87 MG/DL (ref 70–99)
HCT VFR BLD AUTO: 41.9 % (ref 35–47)
HGB BLD-MCNC: 13.8 G/DL (ref 11.7–15.7)
IMM GRANULOCYTES # BLD: 0 10E3/UL
IMM GRANULOCYTES NFR BLD: 0 %
LYMPHOCYTES # BLD AUTO: 1.5 10E3/UL (ref 0.8–5.3)
LYMPHOCYTES NFR BLD AUTO: 21 %
MCH RBC QN AUTO: 32.2 PG (ref 26.5–33)
MCHC RBC AUTO-ENTMCNC: 32.9 G/DL (ref 31.5–36.5)
MCV RBC AUTO: 98 FL (ref 78–100)
MONOCYTES # BLD AUTO: 0.5 10E3/UL (ref 0–1.3)
MONOCYTES NFR BLD AUTO: 7 %
NEUTROPHILS # BLD AUTO: 4.9 10E3/UL (ref 1.6–8.3)
NEUTROPHILS NFR BLD AUTO: 70 %
NRBC # BLD AUTO: 0 10E3/UL
NRBC BLD AUTO-RTO: 0 /100
PLATELET # BLD AUTO: 367 10E3/UL (ref 150–450)
POTASSIUM BLD-SCNC: 3.5 MMOL/L (ref 3.4–5.3)
PROT SERPL-MCNC: 7.5 G/DL (ref 6.8–8.8)
RBC # BLD AUTO: 4.29 10E6/UL (ref 3.8–5.2)
SODIUM SERPL-SCNC: 138 MMOL/L (ref 133–144)
TROPONIN I SERPL HS-MCNC: 4 NG/L
WBC # BLD AUTO: 6.9 10E3/UL (ref 4–11)

## 2022-02-26 PROCEDURE — 99284 EMERGENCY DEPT VISIT MOD MDM: CPT | Mod: 25

## 2022-02-26 PROCEDURE — 84484 ASSAY OF TROPONIN QUANT: CPT | Performed by: EMERGENCY MEDICINE

## 2022-02-26 PROCEDURE — 80053 COMPREHEN METABOLIC PANEL: CPT | Performed by: EMERGENCY MEDICINE

## 2022-02-26 PROCEDURE — 70450 CT HEAD/BRAIN W/O DYE: CPT

## 2022-02-26 PROCEDURE — 36415 COLL VENOUS BLD VENIPUNCTURE: CPT | Performed by: EMERGENCY MEDICINE

## 2022-02-26 PROCEDURE — 250N000011 HC RX IP 250 OP 636: Performed by: EMERGENCY MEDICINE

## 2022-02-26 PROCEDURE — 85014 HEMATOCRIT: CPT | Performed by: EMERGENCY MEDICINE

## 2022-02-26 RX ORDER — ONDANSETRON 4 MG/1
4 TABLET, ORALLY DISINTEGRATING ORAL ONCE
Status: COMPLETED | OUTPATIENT
Start: 2022-02-26 | End: 2022-02-26

## 2022-02-26 RX ORDER — VALACYCLOVIR HYDROCHLORIDE 1 G/1
1000 TABLET, FILM COATED ORAL 3 TIMES DAILY
Qty: 21 TABLET | Refills: 0 | Status: SHIPPED | OUTPATIENT
Start: 2022-02-26 | End: 2022-02-26

## 2022-02-26 RX ORDER — VALACYCLOVIR HYDROCHLORIDE 1 G/1
1000 TABLET, FILM COATED ORAL 3 TIMES DAILY
Qty: 21 TABLET | Refills: 0 | Status: SHIPPED | OUTPATIENT
Start: 2022-02-26

## 2022-02-26 RX ADMIN — ONDANSETRON 4 MG: 4 TABLET, ORALLY DISINTEGRATING ORAL at 13:27

## 2022-02-26 ASSESSMENT — ENCOUNTER SYMPTOMS
NECK STIFFNESS: 1
FEVER: 0
COUGH: 0
SHORTNESS OF BREATH: 0
VOMITING: 0
HEADACHES: 1
NAUSEA: 1

## 2022-02-26 NOTE — ED TRIAGE NOTES
Seen monday for same complaint. Reports neck pain is worse, unable to get into neurologist until next week.

## 2022-02-26 NOTE — ED NOTES
Pt currently debating if she will have the provider do a lumbar puncture or not . She wants to talk to the doctor again , he has been notified.

## 2022-02-26 NOTE — ED PROVIDER NOTES
"  History   Chief Complaint:  Dizziness     The history is provided by the patient.      Debbie Minaya is a 67 year old female with history of hypertension and hyperlipidemia who presents with nausea and neck stiffness. The patient tells us that she was here in the ED five days ago for nausea, headache, neck stiffness and was told to come back to the ED if her headache worsened. Over the last five days her headache has worsened, she is still nauseous, her neck is stiff and she has developed ear pain. For these symptoms, the patient has been taking Ibuprofen and Tylenol which has given her moderate relief. She describes this \"nausea feeling\" as if she has to throw up, but never actually down. The patient denies fever, cough, chest pain, shortness of breath or vomiting.     To note, few years ago the patient had a headache similar to this, she had a scan and they found shingles patches which she was treated for with antivirals and two days later she was pain free. The patient also saw an ENT last week and nothing acute was found.     Review of Systems   Constitutional: Negative for fever.   Respiratory: Negative for cough and shortness of breath.    Cardiovascular: Negative for chest pain.   Gastrointestinal: Positive for nausea. Negative for vomiting.   Musculoskeletal: Positive for neck stiffness.   Neurological: Positive for headaches.   All other systems reviewed and are negative.    Allergies:  Thiazide-Type Diuretics    Medications:  Aspirin 81 mg   Gabapentin  Levothyroxine  Lisinopril  Meclizine  Simvastatin  Tizanidine   Tramadol     Past Medical History:     Hypertension   Hypothyroidism  Restless legs syndrome  Hyperlipidemia     Hyponatremia       Past Surgical History:    Carpal tunnel release - bilateral   Release trigger finger - bilateral      Family History:    Lung cancer  Myocardial infarction     Social History:  The patient presents to the ED alone  The patient is     Physical Exam " "    Physical Exam    Patient Vitals for the past 24 hrs:   BP Temp Temp src Pulse Resp SpO2 Height Weight   02/26/22 0943 (!) 177/85 98.3  F (36.8  C) Oral 80 18 99 % 1.626 m (5' 4\") 56.7 kg (125 lb)     General: Alert and Interactive.   Head: No signs of trauma.   Mouth/Throat: Oropharynx is clear and moist.   Eyes: Conjunctivae are normal. Pupils are equal, round, and reactive to light.   Neck: Normal range of motion. No nuchal rigidity.   CV: Normal rate and regular rhythm.    Resp: Effort normal and breath sounds normal. No respiratory distress.   GI: Soft. There is no tenderness or guarding.   MSK: Normal range of motion. no edema.   Neuro: The patient is alert and oriented to person, place, and time.  PERRLA, EOMI, strength in upper/lower extremities normal and symmetrical.   Sensation normal. Speech normal.  GCS eye subscore is 4. GCS verbal subscore is 5. GCS motor subscore is 6.   Skin: Skin is warm and dry. No rash noted.   Psych: normal mood and affect. behavior is normal.     Emergency Department Course     Imaging:  Head CT w/o contrast   Final Result   IMPRESSION:   1.  No acute intracranial process.        Report per radiology    Laboratory:  Labs Ordered and Resulted from Time of ED Arrival to Time of ED Departure   COMPREHENSIVE METABOLIC PANEL - Normal       Result Value    Sodium 138      Potassium 3.5      Chloride 105      Carbon Dioxide (CO2) 27      Anion Gap 6      Urea Nitrogen 9      Creatinine 0.55      Calcium 9.7      Glucose 87      Alkaline Phosphatase 67      AST 16      ALT 30      Protein Total 7.5      Albumin 4.2      Bilirubin Total 0.5      GFR Estimate >90     TROPONIN I - Normal    Troponin I High Sensitivity 4     CBC WITH PLATELETS AND DIFFERENTIAL    WBC Count 6.9      RBC Count 4.29      Hemoglobin 13.8      Hematocrit 41.9      MCV 98      MCH 32.2      MCHC 32.9      RDW 12.5      Platelet Count 367      % Neutrophils 70      % Lymphocytes 21      % Monocytes 7      % " Eosinophils 1      % Basophils 1      % Immature Granulocytes 0      NRBCs per 100 WBC 0      Absolute Neutrophils 4.9      Absolute Lymphocytes 1.5      Absolute Monocytes 0.5      Absolute Eosinophils 0.0      Absolute Basophils 0.1      Absolute Immature Granulocytes 0.0      Absolute NRBCs 0.0        Emergency Department Course:    Reviewed:  I reviewed nursing notes, vitals, past medical history, Care Everywhere and [facility]    Assessments:  1008 I obtained history and examined the patient as noted above.   1355 I rechecked the patient and explained findings. She tells me that her symptoms feel very similar to a past episode of shingles. I will start her on Valtrex and she will follow up with her neurologist next week.     Interventions:  1327 Zofran 4 mg PO    Disposition:  The patient was discharged to home.     Impression & Plan     Medical Decision Making:  Debbie Minaya is a 67 year old female presented with a new onset headache.  Evaluation in the emergency department, fortunately has been negative. The patient has not had any fever or neck stiffness so I doubt meningitis. The headache was sudden in onset, but has improved.  There is no associated numbness, paresthesia or confusion and I doubt stroke or CNS tumor. The patient was treated symptomatically and pain has improved with medication interventions. The patient received CT head that did not demonstrate a mass, aneurysm, or stroke. As the patient has been improving and in light of the negative workup, the patient wishes to be discharged home. The patient should follow-up with the primary physician within 3 days. Patient also has a neurology appointment scheduled for next week. Return if increasing pain, fever, vomiting or weakness.  Anticipatory guidance given prior to discharge.     Diagnosis:    ICD-10-CM    1. Intractable headache, unspecified chronicity pattern, unspecified headache type  R51.9    2. Nausea  R11.0      Discharge  Medications:  Current Discharge Medication List      START taking these medications    Details   valACYclovir (VALTREX) 1000 mg tablet Take 1 tablet (1,000 mg) by mouth 3 times daily  Qty: 21 tablet, Refills: 0           Scribe Disclosure:  I, Bran Chapa, am serving as a scribe at 9:48 AM on 2/26/2022 to document services personally performed by Alan Hughes MD, based on my observations and the provider's statements to me.            Alan Hughes MD  02/26/22 [id number]